# Patient Record
Sex: FEMALE | Race: BLACK OR AFRICAN AMERICAN | Employment: FULL TIME | URBAN - METROPOLITAN AREA
[De-identification: names, ages, dates, MRNs, and addresses within clinical notes are randomized per-mention and may not be internally consistent; named-entity substitution may affect disease eponyms.]

---

## 2017-01-25 ENCOUNTER — HOSPITAL ENCOUNTER (OUTPATIENT)
Dept: MAMMOGRAPHY | Age: 57
Discharge: HOME | End: 2017-01-25
Attending: FAMILY MEDICINE
Payer: SELF-PAY

## 2017-01-25 DIAGNOSIS — Z12.31 ENCOUNTER FOR SCREENING MAMMOGRAM FOR BREAST CANCER: ICD-10-CM

## 2017-01-25 PROCEDURE — 77067 SCR MAMMO BI INCL CAD: CPT

## 2021-02-08 ENCOUNTER — OFFICE VISIT (OUTPATIENT)
Dept: INTERNAL MEDICINE CLINIC | Age: 61
End: 2021-02-08
Payer: COMMERCIAL

## 2021-02-08 VITALS
HEIGHT: 69 IN | DIASTOLIC BLOOD PRESSURE: 67 MMHG | SYSTOLIC BLOOD PRESSURE: 127 MMHG | OXYGEN SATURATION: 96 % | BODY MASS INDEX: 36.89 KG/M2 | RESPIRATION RATE: 14 BRPM | HEART RATE: 63 BPM | WEIGHT: 249.1 LBS

## 2021-02-08 DIAGNOSIS — E11.29 CONTROLLED TYPE 2 DIABETES MELLITUS WITH OTHER DIABETIC KIDNEY COMPLICATION, WITHOUT LONG-TERM CURRENT USE OF INSULIN (HCC): ICD-10-CM

## 2021-02-08 DIAGNOSIS — E66.01 MORBID OBESITY (HCC): Primary | ICD-10-CM

## 2021-02-08 DIAGNOSIS — R82.90 BAD ODOR OF URINE: ICD-10-CM

## 2021-02-08 PROCEDURE — 99204 OFFICE O/P NEW MOD 45 MIN: CPT | Performed by: INTERNAL MEDICINE

## 2021-02-08 RX ORDER — METFORMIN HYDROCHLORIDE 500 MG/1
TABLET ORAL
COMMUNITY
Start: 2020-12-01 | End: 2021-02-27 | Stop reason: SDUPTHER

## 2021-02-08 RX ORDER — BUDESONIDE AND FORMOTEROL FUMARATE DIHYDRATE 160; 4.5 UG/1; UG/1
2 AEROSOL RESPIRATORY (INHALATION) 2 TIMES DAILY
COMMUNITY
End: 2021-07-09 | Stop reason: SDUPTHER

## 2021-02-08 RX ORDER — MOMETASONE FUROATE 50 UG/1
2 SPRAY, METERED NASAL AS NEEDED
COMMUNITY

## 2021-02-08 RX ORDER — IRBESARTAN 75 MG/1
TABLET ORAL
COMMUNITY
Start: 2021-01-05 | End: 2021-05-10 | Stop reason: SDUPTHER

## 2021-02-08 RX ORDER — HYDROCHLOROTHIAZIDE 12.5 MG/1
CAPSULE ORAL
COMMUNITY
Start: 2020-11-17 | End: 2021-02-27 | Stop reason: SDUPTHER

## 2021-02-08 RX ORDER — CHOLECALCIFEROL (VITAMIN D3) 125 MCG
CAPSULE ORAL DAILY
COMMUNITY

## 2021-02-08 RX ORDER — MONTELUKAST SODIUM 10 MG/1
10 TABLET ORAL DAILY
COMMUNITY
End: 2021-11-10 | Stop reason: SDUPTHER

## 2021-02-08 NOTE — PROGRESS NOTES
Subjective:      Jack Brown is a 56 y.o. female who presents today for   Chief Complaint   Patient presents with   [address]     Prior pcp Dr. Olivia Mckeon    type 2 DM  takes metformin  Last A1c was 6.1    10/3/20  Ophthalmologist sees annually  Podiatrist  Refer to Dr. Fadi Varela  Pneumovax 2020  Flu  Sept 2020  Covid 19 vaccine requests      Hypertension  avapro and hctz  Compliant and denies side effects    Asthma  Has been stable, no recent flare ups    Vit d deficiency  Takes Vit D supplement    Obesity has gained 30 lbs  Height- 5'81/2     Weight-249 lbs          Needs to set up complete physical      Patient Active Problem List    Diagnosis Date Noted    Uncomplicated asthma 75/12/8309    Vitamin D deficiency 11/23/2016     Current Outpatient Medications   Medication Sig Dispense Refill    budesonide-formoteroL (Symbicort) 160-4.5 mcg/actuation HFAA Take 2 Puffs by inhalation two (2) times a day.  irbesartan (AVAPRO) 75 mg tablet TAKE 1 TABLET BY MOUTH ONCE DAILY      hydroCHLOROthiazide (MICROZIDE) 12.5 mg capsule TAKE 1 CAPSULE BY MOUTH ONCE DAILY      metFORMIN (GLUCOPHAGE) 500 mg tablet TAKE 1 TABLET BY MOUTH TWICE DAILY      montelukast (Singulair) 10 mg tablet Take 10 mg by mouth daily.  cholecalciferol, vitamin D3, 50 mcg (2,000 unit) tab Take  by mouth daily.  mometasone (Nasonex) 50 mcg/actuation nasal spray 2 Sprays by Both Nostrils route as needed.  multivitamin with iron (DAILY MULTI-VITAMINS/IRON) tablet Take 1 Tab by mouth daily.  ibuprofen (MOTRIN) 800 mg tablet Take 1 Tab by mouth every eight (8) hours as needed for Pain. 30 Tab 1    cetirizine (ZYRTEC) 10 mg tablet Take  by mouth.  albuterol (PROVENTIL HFA, VENTOLIN HFA, PROAIR HFA) 90 mcg/actuation inhaler Take 1 Puff by inhalation every four (4) hours as needed for Wheezing.  (Patient not taking: Reported on 2/8/2021) 1 Inhaler 6     Allergies   Allergen Reactions    Latex Swelling  Sulfa (Sulfonamide Antibiotics) Swelling     Past Medical History:   Diagnosis Date    Asthma     Diabetes ([address]     History of seasonal allergies     Hypertension      History reviewed. No pertinent surgical history. Family History   Problem Relation Age of Onset    Diabetes Mother     Hypertension Mother     Kidney Disease Mother     No Known Problems Father     Cancer Sister         Breast    Breast Cancer Sister 52    Huntingtons disease Maternal Aunt     Huntingtons disease Maternal Grandfather      Social History     Tobacco Use    Smoking status: Never Smoker    Smokeless tobacco: Never Used   Substance Use Topics    Alcohol use: Yes     Frequency: Monthly or less     Drinks per session: 1 or 2     Binge frequency: Never        Review of Systems    A comprehensive review of systems was negative except for that written in the HPI. Objective:     Visit Vitals  /67 (BP 1 Location: Left upper arm, BP Patient Position: Sitting, BP Cuff Size: Large adult)   Pulse 63   Resp 14   Ht 5' 8.5\" (1.74 m)   Wt 249 lb 1.6 oz (113 kg)   SpO2 96%   BMI 37.32 kg/m²     General:  Alert, cooperative, no distress, appears stated age. Head:  Normocephalic, without obvious abnormality, atraumatic. Eyes:  Conjunctivae/corneas clear. PERRL, EOMs intact. Fundi benign. Ears:  Normal TMs and external ear canals both ears. Neck: Supple, symmetrical, trachea midline, no adenopathy, thyroid: no enlargement/tenderness/nodules, no carotid bruit and no JVD. Back:   Symmetric, no curvature. ROM normal. No CVA tenderness. Lungs:   Clear to auscultation bilaterally. Chest wall:  No tenderness or deformity. Heart:  Regular rate and rhythm, S1, S2 normal, no murmur, click, rub or gallop. Abdomen:   Soft, non-tender. Bowel sounds normal. No masses,  No organomegaly. Extremities: Extremities normal, atraumatic, no cyanosis or edema. Pulses: 2+ and symmetric all extremities.    Skin: Skin color, texture, turgor normal. No rashes or lesions. Lymph nodes: Cervical, supraclavicular, and axillary nodes normal.   Neurologic: CNII-XII intact. Normal strength, sensation and reflexes throughout. Assessment/Plan:       ICD-10-CM ICD-9-CM    1. Morbid obesity ([facility] [address]  E66.01 278.01 REFERRAL TO NUTRITION      REFERRAL TO WEIGHT LOSS   2. Controlled type 2 diabetes mellitus with other diabetic kidney complication, without long-term current use of insulin ([facility])  E11.29 250.40 LIPID PANEL      METABOLIC PANEL, COMPREHENSIVE      HEMOGLOBIN A1C WITH EAG      MICROALBUMIN, UR, RAND W/ MICROALB/CREAT RATIO      REFERRAL TO PODIATRY      MICROALBUMIN, UR, RAND W/ MICROALB/CREAT RATIO      HEMOGLOBIN A1C WITH EAG      LIPID PANEL      METABOLIC PANEL, COMPREHENSIVE   3. Bad odor of urine  R82.90 791.9 URINALYSIS W/ RFLX MICROSCOPIC      URINALYSIS W/ RFLX MICROSCOPIC          Advised her to call back or return to office if symptoms worsen/change/persist.  Discussed expected course/resolution/complications of diagnosis in detail with patient. Medication risks/benefits/costs/interactions/alternatives discussed with patient. She was given an after visit summary which includes diagnoses, current medications, & vitals. She expressed understanding with the diagnosis and plan.

## 2021-02-08 NOTE — PROGRESS NOTES
Room: 102    Identified pt with two pt identifiers(name and ). Reviewed record in preparation for visit and have obtained necessary documentation. All patient medications has been reviewed. Chief Complaint   Patient presents with   [address] PCP:  [facility] [facility] Dr. Jose Shah Maintenance Due   Topic    Hepatitis C Screening     Pneumococcal 0-64 years (1 of 1 - PPSV23)    DTaP/Tdap/Td series (1 - Tdap)    PAP AKA CERVICAL CYTOLOGY     Lipid Screen     Shingrix Vaccine Age 49> (1 of 2)    Colorectal Cancer Screening Combo     Breast Cancer Screen Mammogram     Flu Vaccine (1)     Pap: 3/2020 Dr. Lanita Riedel [address]     Shingrix:      Tdap: pt not sure     Colonoscopy:  Dr. Abundio Walls     Pneumonia; last year     Flu shot: 10/2020   Vitals:    21 1040   BP: 127/67   Pulse: 63   Resp: 14   SpO2: 96%   Weight: 249 lb 1.6 oz (113 kg)   Height: 5' 8.5\" (1.74 m)   PainSc:   0 - No pain       4. Have you been to the ER, urgent care clinic since your last visit? Hospitalized since your last visit? No    5. Have you seen or consulted any other health care providers outside of the [address] [place] [address] since your last visit? Include any pap smears or colon screening. No    Patient is accompanied by self I have received verbal consent from Radha Tate to discuss any/all medical information while they are present in the room.

## 2021-02-09 LAB
ALBUMIN SERPL-MCNC: 3.9 G/DL (ref 3.5–5)
ALBUMIN/GLOB SERPL: 1.1 {RATIO} (ref 1.1–2.2)
ALP SERPL-CCNC: 86 U/L (ref 45–117)
ALT SERPL-CCNC: 21 U/L (ref 12–78)
ANION GAP SERPL CALC-SCNC: 7 MMOL/L (ref 5–15)
APPEARANCE UR: ABNORMAL
AST SERPL-CCNC: 15 U/L (ref 15–37)
BILIRUB SERPL-MCNC: 0.2 MG/DL (ref 0.2–1)
BILIRUB UR QL: NEGATIVE
BUN SERPL-MCNC: 15 MG/DL (ref 6–20)
BUN/CREAT SERPL: 17 (ref 12–20)
CALCIUM SERPL-MCNC: 9.8 MG/DL (ref 8.5–10.1)
CHLORIDE SERPL-SCNC: 105 MMOL/L (ref 97–108)
CHOLEST SERPL-MCNC: 176 MG/DL
CO2 SERPL-SCNC: 27 MMOL/L (ref 21–32)
COLOR UR: ABNORMAL
CREAT SERPL-MCNC: 0.87 MG/DL (ref 0.55–1.02)
CREAT UR-MCNC: 142 MG/DL
EST. AVERAGE GLUCOSE BLD GHB EST-MCNC: 128 MG/DL
GLOBULIN SER CALC-MCNC: 3.6 G/DL (ref 2–4)
GLUCOSE SERPL-MCNC: 101 MG/DL (ref 65–100)
GLUCOSE UR STRIP.AUTO-MCNC: NEGATIVE MG/DL
HBA1C MFR BLD: 6.1 % (ref 4–5.6)
HDLC SERPL-MCNC: 90 MG/DL
HDLC SERPL: 2 {RATIO} (ref 0–5)
HGB UR QL STRIP: NEGATIVE
KETONES UR QL STRIP.AUTO: NEGATIVE MG/DL
LDLC SERPL CALC-MCNC: 70.8 MG/DL (ref 0–100)
LEUKOCYTE ESTERASE UR QL STRIP.AUTO: ABNORMAL
LIPID PROFILE,FLP: NORMAL
MICROALBUMIN UR-MCNC: 2.18 MG/DL
MICROALBUMIN/CREAT UR-RTO: 15 MG/G (ref 0–30)
NITRITE UR QL STRIP.AUTO: NEGATIVE
PH UR STRIP: 5.5 [PH] (ref 5–8)
POTASSIUM SERPL-SCNC: 3.9 MMOL/L (ref 3.5–5.1)
PROT SERPL-MCNC: 7.5 G/DL (ref 6.4–8.2)
PROT UR STRIP-MCNC: NEGATIVE MG/DL
SODIUM SERPL-SCNC: 139 MMOL/L (ref 136–145)
SP GR UR REFRACTOMETRY: 1.02 (ref 1–1.03)
TRIGL SERPL-MCNC: 76 MG/DL (ref ?–150)
UROBILINOGEN UR QL STRIP.AUTO: 0.2 EU/DL (ref 0.2–1)
VLDLC SERPL CALC-MCNC: 15.2 MG/DL

## 2021-02-22 ENCOUNTER — HOSPITAL ENCOUNTER (OUTPATIENT)
Dept: NUTRITION | Age: 61
Discharge: HOME | End: 2021-02-22
Payer: COMMERCIAL

## 2021-02-22 DIAGNOSIS — E66.01 MORBID OBESITY (HCC): ICD-10-CM

## 2021-02-22 PROCEDURE — 97802 MEDICAL NUTRITION INDIV IN: CPT | Performed by: DIETITIAN, REGISTERED

## 2021-02-22 NOTE — PROGRESS NOTES
[address] [facility]     Nutrition Assessment - Medical Nutrition Therapy   Outpatient Initial Evaluation         Patient Name: Hermelinda Lo : 1960   Treatment Diagnosis: E66.01 (ICD-10-CM) - Morbid obesity [facility])   Referral Source: Hector Augustin [facility]): 2021     In time:   10:30am          Out time:   11:30am   Total Treatment Time (min): 60     Gender: female Age: 59 y.o. Ht: 68 in Wt:  250 lb  kg   BMI: 38 (obesity class II) AF 1.2 BMR Female 1745     Past Medical History:  Patient Active Problem List   Diagnosis Code    Uncomplicated asthma I44.292    Vitamin D deficiency E55.9        Pertinent Medications:     Current Outpatient Medications:     budesonide-formoteroL (Symbicort) 160-4.5 mcg/actuation HFAA, Take 2 Puffs by inhalation two (2) times a day., Disp: , Rfl:     irbesartan (AVAPRO) 75 mg tablet, TAKE 1 TABLET BY MOUTH ONCE DAILY, Disp: , Rfl:     hydroCHLOROthiazide (MICROZIDE) 12.5 mg capsule, TAKE 1 CAPSULE BY MOUTH ONCE DAILY, Disp: , Rfl:     metFORMIN (GLUCOPHAGE) 500 mg tablet, TAKE 1 TABLET BY MOUTH TWICE DAILY, Disp: , Rfl:     montelukast (Singulair) 10 mg tablet, Take 10 mg by mouth daily. , Disp: , Rfl:     cholecalciferol, vitamin D3, 50 mcg (2,000 unit) tab, Take  by mouth daily. , Disp: , Rfl:     mometasone (Nasonex) 50 mcg/actuation nasal spray, 2 Sprays by Both Nostrils route as needed. , Disp: , Rfl:     cetirizine (ZYRTEC) 10 mg tablet, Take  by mouth., Disp: , Rfl:     multivitamin with iron (DAILY MULTI-VITAMINS/IRON) tablet, Take 1 Tab by mouth daily. , Disp: , Rfl:     ibuprofen (MOTRIN) 800 mg tablet, Take 1 Tab by mouth every eight (8) hours as needed for Pain., Disp: 30 Tab, Rfl: 1    albuterol (PROVENTIL HFA, VENTOLIN HFA, PROAIR HFA) 90 mcg/actuation inhaler, Take 1 Puff by inhalation every four (4) hours as needed for Wheezing.  (Patient not taking: Reported on 2021), Disp: 1 Inhaler, Rfl: 6     Biochemical Data:   Lab Results   Component Value Date/Time    Hemoglobin A1c 6.1 (H) 02/08/2021 12:41 PM     Lab Results   Component Value Date/Time    Sodium 139 02/08/2021 12:41 PM    Potassium 3.9 02/08/2021 12:41 PM    Chloride 105 02/08/2021 12:41 PM    CO2 27 02/08/2021 12:41 PM    Anion gap 7 02/08/2021 12:41 PM    Glucose 101 (H) 02/08/2021 12:41 PM    BUN 15 02/08/2021 12:41 PM    Creatinine 0.87 02/08/2021 12:41 PM    BUN/Creatinine ratio 17 02/08/2021 12:41 PM    GFR est AA >60 02/08/2021 12:41 PM    GFR est non-AA >60 02/08/2021 12:41 PM    Calcium 9.8 02/08/2021 12:41 PM    Bilirubin, total 0.2 02/08/2021 12:41 PM    Alk. phosphatase 86 02/08/2021 12:41 PM    Protein, total 7.5 02/08/2021 12:41 PM    Albumin 3.9 02/08/2021 12:41 PM    Globulin 3.6 02/08/2021 12:41 PM    A-G Ratio 1.1 02/08/2021 12:41 PM    ALT (SGPT) 21 02/08/2021 12:41 PM    AST (SGOT) 15 02/08/2021 12:41 PM     Lab Results   Component Value Date/Time    Cholesterol, total 176 02/08/2021 12:41 PM    HDL Cholesterol 90 02/08/2021 12:41 PM    LDL, calculated 70.8 02/08/2021 12:41 PM    VLDL, calculated 15.2 02/08/2021 12:41 PM    Triglyceride 76 02/08/2021 12:41 PM    CHOL/HDL Ratio 2.0 02/08/2021 12:41 PM     BP Readings from Last 3 Encounters:   02/08/21 127/67   11/23/16 (!) 158/92   No results found for: Florentino Mcneill, Yadi Mojica, XQVID, VD3RIA, [id number]         Assessment:   Pt is a 57yo female here for help with weight loss. She notes she knows what to eat but needs help with accountability. Notes with Pandemic it has been harder. Gained 30# in 1 year. Not interested in weight loss surgery. Pt used to do weight watchers successfully 5-6 years ago down to 184#. Pt notes not wanting to do a virtual program.   Pt's desired weight was 170-180#. Goals are to be able to walk and job. Pt has diabetes. Controlled A1C at 6.1%. pt notes wanting to get it down in the 5s. Pt does not check SMBG. History of HTN.    Support system with daughter at home. Activity: less than in the past due to fear of COVID. Wants to start walking again. Has tapes to use inside if unable to go outside. Loves to garden when weather permits. Food & Nutrition: Pt identified Issues:  Snacker: crackers, pb or ritz or  Mindless eating while watching TV. Fruit or nuts. Night eater - staying up till 2am due to not needing to wake up early. History of night shift. Expressed concerns with eating bread and thinking this may lead to weight gain. Pt's intake of crackers and snacks are higher in calorie and carbohydrate than her bread. Asked questions about keto diet and if it would be a good idea for her. B- cereal (honey bunches of oats with strawberry) 2 cups+ banana + no sugar almond milk + coffee with unsweetened creamer + splenda/stevia. S- cookie, cheezits - crunch/munch  L- stromboli leftovers (1-2\")  OR sandwich with ham/turkey + cheese + 647 bread or wheat bread (pan size)  S- small donuts  D-   S- tangelo  Night snacking on crackers. Drinks: water, coffee         Estimate Needs:    Equation( [x] MSJ ; []  HBE; [] Rowell; [] other)  * Activity Factor (1.2) -250-500   Calories: 3020-7492  Protein: 91 Carbs: 203-225 Fat: 60   Kcal/day  g/day  g/day  g/day        percent: 20  45-50  30               Nutrition Diagnosis obesity R/T excessive energy intake AEB BMI of 38 and dietary recall showing mindless snacking at night and during the day. Physical inactivity R/T emotional barriers AEB pt report of fears of COVID reducing her overall activity level and movement    Food and nutrition related knowledge deficit R/T lack of prior education for healthy weight loss AEB request for counseling     Nutrition Intervention &  Education: Educated pt on the basics of healthy weight loss and the rationale for dietary modifications and increased activity.  Educated pt on lean proteins, healthy fats, non-starchy vegetables, and complex carbohydrate food sources. Discussed limiting carbohydrates and calories form night time snacking, label reading for serving sizes, meal timing, and keeping a food log. Provided example day. suggested alternative lower calorie snacks for at night while pt works to move her bed time up earlier and limit snacking all together. Set exercise goals. Handouts Provided: []  Carbohydrates  []  Protein  [x]  Non-starchy Vegatbles  []  Food Label  [x]  Meal and Snack Ideas  [x]  Food Journals []  Diabetes  []  Cholesterol  []  Sodium  []  Gen Nutr Guidelines  []  SBGM Guidelines  [x]  Others: example day   Information Reviewed with: pt   Readiness to Change Stage: []  Pre-contemplative    []  Contemplative  [x]  Preparation               []  Action                  []  Maintenance   Potential Barriers to Learning: []  Decline in memory    []  Language barrier   []  Other:  [x]  Emotional (fear of COVID)                 []  Limited mobility     []  None  []  Lack of motivation     [] Vision, hearing or cognitive impairment   Expected Compliance: Good due to support at home from daughter     Nutritional Goal - To promote lifestyle changes to result in:    [x]  Weight loss  []  Improved diabetic control  []  Decreased cholesterol levels  []  Decreased blood pressure  []  Weight maintenance []  Preventing any interactions associated with food allergies  []  Adequate weight gain toward goal weight  []  Other:        Patient Goals:   - move bed time up to 11pm. Start by moving it up by 30min per night. Try reading a book instead of screens 30min before bed if unable to sleep. - Improve physical activity w/goal to walk for 30 minutes 5 times per week. - Increase accountability and awareness of food choices by recording food and beverage intake by using a food journal at least 3 days per week.     - swap lower calorie snacks at night: list provided (veggies, portion out crackers, lower calorie snacks)     Dietitian Signature: Omar Ibarra MS, RD, CSSD Date: 2/22/2021   Follow-up: March 8 @ 9am Time: 10:35 AM

## 2021-03-08 ENCOUNTER — HOSPITAL ENCOUNTER (OUTPATIENT)
Dept: NUTRITION | Age: 61
Discharge: HOME | End: 2021-03-08
Payer: MEDICAID

## 2021-03-08 DIAGNOSIS — E66.9 OBESITY, UNSPECIFIED CLASSIFICATION, UNSPECIFIED OBESITY TYPE, UNSPECIFIED WHETHER SERIOUS COMORBIDITY PRESENT: ICD-10-CM

## 2021-03-08 PROCEDURE — 97803 MED NUTRITION INDIV SUBSEQ: CPT | Performed by: DIETITIAN, REGISTERED

## 2021-03-08 NOTE — PROGRESS NOTES
NUTRITION - FOLLOW-UP TREATMENT NOTE  Patient Name: Nael Lat         Date: 3/8/2021  : 1960    YES Patient  Verified  Diagnosis: E66.01 (ICD-10-CM) - Morbid obesity ([facility] [address]   In time:   9:08am        Out time:   10:08am   Total Treatment Time (min): 30     SUBJECTIVE/ASSESSMENT  Current Wt: 242.4 Previous Wt: 250 Wt Change: -7.6     Initial Wt: 250 Total Wt change: -7.6 Height: 68     Changes in medication or medical history? Any new allergies, surgeries or procedures? \NO    If yes, update Summary List   Multivitamin from [employer] (unsure if over 52yo)  Vitamin D (Nature Made)  Vitamin C - EmergenC (not daily)     Nutrition Diagnosis        Diagnosis Status: obesity R/T excessive energy intake AEB BMI of 38 and dietary recall showing mindless snacking at night and during the day. [x]  Improved []  No Change    []  Declined   []  Discontinued      Physical inactivity R/T emotional barriers AEB pt report of fears of COVID reducing her overall activity level and movement  [x]  Improved [x]  No Change    []  Declined   []  Discontinued      Food and nutrition related knowledge deficit R/T lack of prior education for healthy weight loss AEB request for counseling  [x]  Improved []  No Change    []  Declined   []  Discontinued        Nutrition Monitoring and Evaluation: Difficulty with tracking some foods but more aware of what she had by tracking. Discussed using michael instead to help with  Looking up various items. Notable that multiple days tracked are <1200kcal. She was aware and felt that with being less busy at work this would not happen in the future. Pt agreed. She is excited to add in exercise once not working 12hr shifts each day. No days off in past 2 weeks. Reviewed goals. She plans to go to grocery store to get more snack options for at home and feels confident in continuing changes made. Previous goals: Work schedule has dictated earlier bed time.  - move bed time up to 11pm. Start by moving it up by 30min per night. Try reading a book instead of screens 30min before bed if unable to sleep. Not met. 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 - Improve physical activity w/goal to walk for 30 minutes 5 times per week   Met. - Increase accountability and awareness of food choices by recording food and beverage intake by using a food journal at least 3 days per week. No night snacking as has not been awake. - swap lower calorie snacks at night: list provided (veggies, portion out crackers, lower calorie snacks)     Nutrition Prescription and  Intervention CBT  Reviewed logs and educated on michael options for tracking. Self monitoring - michael food log. Exercise goals to work towards 150min of activity per week minimum. Answered questions about cereal, granola, and other snack foods. Recommended looking for cereal with 5g or more of fiber per serving and <9g added sugar. Educated pm third party testing for supplements. Estimate Needs:    Equation( [x]?? MSJ ; []??  HBE; []?? Rowell; []?? other)  * Activity Factor (1.2) -250-500   Calories: 1362-5175  Protein: 91 Carbs: 203-225 Fat: 60   Kcal/day  g/day  g/day  g/day            percent: 20   45-50   30        Patient Education:  [x]  Review current plan with patient   []  Other:    Handouts/  Information Provided: []  Carbohydrates  []  Protein  []  Fiber  []  Serving Sizes  []  Fluids  []  General guidelines []  Diabetes  []  Cholesterol  []  Sodium  []  SBGM  []  Food Journals  []  Others:      Patient Goals -continue tracking 3 days per week. Can use michael instead of paper to help. -start exercise routine with walking or elliptical 3 x per week for 30min minimum  -go to bed by 12:30pm to decrease likelihood of night time eating.         PLAN  [x]  Continue on current plan []  Follow-up PRN   []  Discharge due to :    [x]  Next appt: 4 weeks     Dietitian: Yojana Morris MS, RD, CSSD    Date: 3/8/2021 Time: 10:30 AM

## 2021-04-05 ENCOUNTER — HOSPITAL ENCOUNTER (OUTPATIENT)
Dept: NUTRITION | Age: 61
Discharge: HOME | End: 2021-04-05
Payer: COMMERCIAL

## 2021-04-05 DIAGNOSIS — Z71.3 DIETARY COUNSELING AND SURVEILLANCE: ICD-10-CM

## 2021-04-05 DIAGNOSIS — E66.9 OBESITY, UNSPECIFIED CLASSIFICATION, UNSPECIFIED OBESITY TYPE, UNSPECIFIED WHETHER SERIOUS COMORBIDITY PRESENT: ICD-10-CM

## 2021-04-05 PROCEDURE — 97803 MED NUTRITION INDIV SUBSEQ: CPT | Performed by: DIETITIAN, REGISTERED

## 2021-04-05 NOTE — PROGRESS NOTES
NUTRITION - FOLLOW-UP TREATMENT NOTE  Patient Name: Mary Suazo         Date: 2021  : 1960    YES Patient  Verified  Diagnosis: E66.01 (ICD-10-CM) - Morbid obesity ([address]   In time:   9:30am        Out time:   10:00am   Total Treatment Time (min): 30     SUBJECTIVE/ASSESSMENT  Current Wt: 238 Previous Wt: 242.4 Wt Change: -4.4     Initial Wt: 250 Total Wt change: -12 Height: 68     Changes in medication or medical history? Any new allergies, surgeries or procedures? \NO    If yes, update Summary List        Nutrition Diagnosis        Diagnosis Status: obesity R/T excessive energy intake AEB BMI of 38 and dietary recall showing mindless snacking at night and during the day. [x]  Improved []  No Change    []  Declined   []  Discontinued      Physical inactivity R/T emotional barriers AEB pt report of fears of COVID reducing her overall activity level and movement  [x]  Improved []  No Change    []  Declined   []  Discontinued      Food and nutrition related knowledge deficit R/T lack of prior education for healthy weight loss AEB request for counseling  [x]  Improved []  No Change    []  Declined   []  Discontinued        Nutrition Monitoring and Evaluation: Tried elliptical for 5 minutes and found it difficult. Starting with videos with daughter. 15-20 min 3 times per week. Plans to start gardening. Has not started walking. Very few days of going below calorie goals. 51\" waist  BMI 36.2- Obesity Class II  RMR MSJ- 1683    Previous goals:  Met. -continue tracking 3 days per week. Can use michael instead of paper to help. Improved. 20 min videos 3 times per week. -start exercise routine with walking or elliptical 3 x per week for 30min minimum  Improved. Going to bed earlier most nights. Less night time snacking. choosing lower calorie snacks for night instead. -go to bed by 12:30pm to decrease likelihood of night time eating.       Nutrition Prescription and  Intervention CBT   Self monitoring - michael food log. Exercise goals to work towards 150min of activity per week minimum. Discussed meal timing and recommended earlier end to eating window. Estimate Needs:    Equation( [x]?? MSJ ; []??  HBE; []?? Rowell; []?? other)  * Activity Factor (1.2) -250-500   Calories: 0534-0259  Protein: 91 Carbs: 203-225 Fat: 60   Kcal/day  g/day  g/day  g/day            percent: 20   45-50   30        Patient Education:  [x]  Review current plan with patient   []  Other:    Handouts/  Information Provided: []  Carbohydrates  []  Protein  []  Fiber  []  Serving Sizes  []  Fluids  []  General guidelines []  Diabetes  []  Cholesterol  []  Sodium  []  SBGM  []  Food Journals  []  Others:      Patient Goals -continue tracking 3 days per week.  Can use michael instead of paper to help.   -continuing exercise routine with walking 3x/week for 30 minutes + 2 exercise videos/day with daughter      PLAN  [x]  Continue on current plan []  Follow-up PRN   []  Discharge due to :    [x]  Next appt: 4 weeks     Dietitian: Wally Starr MS, RD, CSSD    Date: 4/5/2021 Time: 10:30 AM

## 2021-05-10 ENCOUNTER — OFFICE VISIT (OUTPATIENT)
Dept: INTERNAL MEDICINE CLINIC | Age: 61
End: 2021-05-10
Payer: COMMERCIAL

## 2021-05-10 VITALS
WEIGHT: 235 LBS | DIASTOLIC BLOOD PRESSURE: 71 MMHG | SYSTOLIC BLOOD PRESSURE: 106 MMHG | OXYGEN SATURATION: 98 % | RESPIRATION RATE: 18 BRPM | HEART RATE: 66 BPM | HEIGHT: 69 IN | BODY MASS INDEX: 34.8 KG/M2 | TEMPERATURE: 98 F

## 2021-05-10 DIAGNOSIS — E55.9 VITAMIN D DEFICIENCY: Primary | ICD-10-CM

## 2021-05-10 DIAGNOSIS — E66.01 MORBID OBESITY (HCC): ICD-10-CM

## 2021-05-10 DIAGNOSIS — E11.29 CONTROLLED TYPE 2 DIABETES MELLITUS WITH OTHER DIABETIC KIDNEY COMPLICATION, WITHOUT LONG-TERM CURRENT USE OF INSULIN (HCC): ICD-10-CM

## 2021-05-10 DIAGNOSIS — L81.9 HYPOPIGMENTATION: ICD-10-CM

## 2021-05-10 DIAGNOSIS — I10 ESSENTIAL HYPERTENSION: ICD-10-CM

## 2021-05-10 DIAGNOSIS — D17.20 LIPOMA OF UPPER EXTREMITY, UNSPECIFIED LATERALITY: ICD-10-CM

## 2021-05-10 PROCEDURE — 99214 OFFICE O/P EST MOD 30 MIN: CPT | Performed by: INTERNAL MEDICINE

## 2021-05-10 RX ORDER — HYDROCHLOROTHIAZIDE 12.5 MG/1
CAPSULE ORAL
Qty: 90 CAP | Refills: 1 | Status: SHIPPED | OUTPATIENT
Start: 2021-05-10 | End: 2021-11-10 | Stop reason: SDUPTHER

## 2021-05-10 RX ORDER — IRBESARTAN 75 MG/1
TABLET ORAL
Qty: 90 TAB | Refills: 1 | Status: SHIPPED | OUTPATIENT
Start: 2021-05-10 | End: 2021-11-10 | Stop reason: SDUPTHER

## 2021-05-10 NOTE — PROGRESS NOTES
Subjective:      Thierry Virk is a 59 y.o. female who presents today for   Chief Complaint   Patient presents with    Diabetes    Hypertension          type 2 DM  takes metformin  Last A1c was 6.1    2/2021  Ophthalmologist sees annually  Podiatrist she is followed by Dr. Ayah Padron  Flu  Sept 2020  Covid 19 vaccine  moderna x 2        Hypertension  BP today 106/71  Takes avapro and hctz  Compliant and denies side effects     Asthma  Has been stable, no recent flare ups     Vit d deficiency  Takes Vit D supplement     Obesity  Height- 5'81/2     Weight-249 lbs   At last visit     Height 5'81/2      Weight-  235 lbs     Today    She has lost 14 lbs since last visit! Tinea pedis  onychomycosis     Was referred to Angelica Klein (nutritionist) and Dr. Noemí Lawson (weight loss)  Patient Active Problem List    Diagnosis Date Noted    Uncomplicated asthma 93/45/6427    Vitamin D deficiency 11/23/2016     Current Outpatient Medications   Medication Sig Dispense Refill    hydroCHLOROthiazide (MICROZIDE) 12.5 mg capsule TAKE 1 CAPSULE BY MOUTH ONCE DAILY 90 Cap 1    metFORMIN (GLUCOPHAGE) 500 mg tablet TAKE 1 TABLET BY MOUTH TWICE DAILY 180 Tab 1    budesonide-formoteroL (Symbicort) 160-4.5 mcg/actuation HFAA Take 2 Puffs by inhalation two (2) times a day.  irbesartan (AVAPRO) 75 mg tablet TAKE 1 TABLET BY MOUTH ONCE DAILY      montelukast (Singulair) 10 mg tablet Take 10 mg by mouth daily.  cholecalciferol, vitamin D3, 50 mcg (2,000 unit) tab Take  by mouth daily.  mometasone (Nasonex) 50 mcg/actuation nasal spray 2 Sprays by Both Nostrils route as needed.  cetirizine (ZYRTEC) 10 mg tablet Take  by mouth.  multivitamin with iron (DAILY MULTI-VITAMINS/IRON) tablet Take 1 Tab by mouth daily.  ibuprofen (MOTRIN) 800 mg tablet Take 1 Tab by mouth every eight (8) hours as needed for Pain.  30 Tab 1    albuterol (PROVENTIL HFA, VENTOLIN HFA, PROAIR HFA) 90 mcg/actuation inhaler Take 1 Puff by inhalation every four (4) hours as needed for Wheezing. (Patient not taking: Reported on 2/8/2021) 1 Inhaler 6     Allergies   Allergen Reactions    Latex Swelling    Sulfa (Sulfonamide Antibiotics) Swelling     Past Medical History:   Diagnosis Date    Asthma     Diabetes ([address]     History of seasonal allergies     Hypertension      No past surgical history on file. Family History   Problem Relation Age of Onset    Diabetes Mother     Hypertension Mother     Kidney Disease Mother     No Known Problems Father     Cancer Sister         Breast    Breast Cancer Sister 52    Huntingtons disease Maternal Aunt     Huntingtons disease Maternal Grandfather      Social History     Tobacco Use    Smoking status: Never Smoker    Smokeless tobacco: Never Used   Substance Use Topics    Alcohol use: Yes     Frequency: Monthly or less     Drinks per session: 1 or 2     Binge frequency: Never        Review of Systems    A comprehensive review of systems was negative except for that written in the HPI. Objective:     Visit Vitals  Ht 5' 8.5\" (1.74 m)   BMI 37.32 kg/m²     General:  Alert, cooperative, no distress, appears stated age. Head:  Normocephalic, without obvious abnormality, atraumatic. Eyes:  Conjunctivae/corneas clear. PERRL, EOMs intact. Fundi benign. Ears:  Normal TMs and external ear canals both ears. Nose: Nares normal. Septum midline. Mucosa normal. No drainage or sinus tenderness. Throat: Lips, mucosa, and tongue normal. Teeth and gums normal.   Neck: Supple, symmetrical, trachea midline, no adenopathy, thyroid: no enlargement/tenderness/nodules, no carotid bruit and no JVD. Back:   Symmetric, no curvature. ROM normal. No CVA tenderness. Lungs:   Clear to auscultation bilaterally. Chest wall:  No tenderness or deformity. Heart:  Regular rate and rhythm, S1, S2 normal, no murmur, click, rub or gallop. Abdomen:   Soft, non-tender.  Bowel sounds normal. No masses,  No organomegaly. Extremities: Extremities normal, atraumatic, no cyanosis or edema. Pulses: 2+ and symmetric all extremities. Skin: Skin color, texture, turgor normal. No rashes or lesions. Lymph nodes: Cervical, supraclavicular, and axillary nodes normal.   Neurologic: CNII-XII intact. Normal strength, sensation and reflexes throughout. Assessment/Plan:       ICD-10-CM ICD-9-CM    1. Vitamin D deficiency  E55.9 268.9 Continue Vit D supplement   2. Morbid obesity ([address]  E66.01 278.01 Continue dietary changes and work with nutritionist   3. Controlled type 2 diabetes mellitus with other diabetic kidney complication, without long-term current use of insulin ([facility])  E11.29 250.40 HEMOGLOBIN A1C WITH EAG      HEMOGLOBIN A1C WITH EAG   4. Essential hypertension  I10 401.9 irbesartan (AVAPRO) 75 mg tablet      hydroCHLOROthiazide (MICROZIDE) 12.5 mg capsule      METABOLIC PANEL, COMPREHENSIVE      URINALYSIS W/ RFLX MICROSCOPIC      URINALYSIS W/ RFLX MICROSCOPIC      METABOLIC PANEL, COMPREHENSIVE   5. Hypopigmentation  L81.9 709.00 REFERRAL TO DERMATOLOGY   6. Lipoma of upper extremity, unspecified laterality  D17.20 214.8 REFERRAL TO GENERAL SURGERY          Advised her to call back or return to office if symptoms worsen/change/persist.  Discussed expected course/resolution/complications of diagnosis in detail with patient. Medication risks/benefits/costs/interactions/alternatives discussed with patient. She was given an after visit summary which includes diagnoses, current medications, & vitals. She expressed understanding with the diagnosis and plan.

## 2021-05-10 NOTE — PROGRESS NOTES
Issa Palomino is a 59 y.o. female      Chief Complaint   Patient presents with    Diabetes    Hypertension     1. Have you been to the ER, urgent care clinic since your last visit? Hospitalized since your last visit? No    2. Have you seen or consulted any other health care providers outside of the [address] [place] [address] since your last visit? Include any pap smears or colon screening.   No

## 2021-05-11 LAB
ALBUMIN SERPL-MCNC: 4 G/DL (ref 3.5–5)
ALBUMIN/GLOB SERPL: 1.1 {RATIO} (ref 1.1–2.2)
ALP SERPL-CCNC: 95 U/L (ref 45–117)
ALT SERPL-CCNC: 43 U/L (ref 12–78)
ANION GAP SERPL CALC-SCNC: 8 MMOL/L (ref 5–15)
APPEARANCE UR: CLEAR
AST SERPL-CCNC: 24 U/L (ref 15–37)
BACTERIA URNS QL MICRO: ABNORMAL /HPF
BILIRUB SERPL-MCNC: 0.3 MG/DL (ref 0.2–1)
BILIRUB UR QL: NEGATIVE
BUN SERPL-MCNC: 14 MG/DL (ref 6–20)
BUN/CREAT SERPL: 16 (ref 12–20)
CALCIUM SERPL-MCNC: 9.8 MG/DL (ref 8.5–10.1)
CAOX CRY URNS QL MICRO: ABNORMAL
CHLORIDE SERPL-SCNC: 109 MMOL/L (ref 97–108)
CO2 SERPL-SCNC: 26 MMOL/L (ref 21–32)
COLOR UR: ABNORMAL
CREAT SERPL-MCNC: 0.85 MG/DL (ref 0.55–1.02)
EPITH CASTS URNS QL MICRO: ABNORMAL /LPF
EST. AVERAGE GLUCOSE BLD GHB EST-MCNC: 131 MG/DL
GLOBULIN SER CALC-MCNC: 3.7 G/DL (ref 2–4)
GLUCOSE SERPL-MCNC: 109 MG/DL (ref 65–100)
GLUCOSE UR STRIP.AUTO-MCNC: NEGATIVE MG/DL
HBA1C MFR BLD: 6.2 % (ref 4–5.6)
HGB UR QL STRIP: NEGATIVE
KETONES UR QL STRIP.AUTO: NEGATIVE MG/DL
LEUKOCYTE ESTERASE UR QL STRIP.AUTO: ABNORMAL
NITRITE UR QL STRIP.AUTO: POSITIVE
PH UR STRIP: 5.5 [PH] (ref 5–8)
POTASSIUM SERPL-SCNC: 4.3 MMOL/L (ref 3.5–5.1)
PROT SERPL-MCNC: 7.7 G/DL (ref 6.4–8.2)
PROT UR STRIP-MCNC: NEGATIVE MG/DL
RBC #/AREA URNS HPF: ABNORMAL /HPF (ref 0–5)
SODIUM SERPL-SCNC: 143 MMOL/L (ref 136–145)
SP GR UR REFRACTOMETRY: 1.02 (ref 1–1.03)
UROBILINOGEN UR QL STRIP.AUTO: 0.2 EU/DL (ref 0.2–1)
WBC URNS QL MICRO: ABNORMAL /HPF (ref 0–4)

## 2021-05-13 ENCOUNTER — OFFICE VISIT (OUTPATIENT)
Dept: SURGERY | Age: 61
End: 2021-05-13
Payer: COMMERCIAL

## 2021-05-13 VITALS
DIASTOLIC BLOOD PRESSURE: 77 MMHG | TEMPERATURE: 96.8 F | SYSTOLIC BLOOD PRESSURE: 113 MMHG | OXYGEN SATURATION: 93 % | HEIGHT: 68 IN | WEIGHT: 235 LBS | BODY MASS INDEX: 35.61 KG/M2 | HEART RATE: 72 BPM

## 2021-05-13 DIAGNOSIS — D17.20 LIPOMA OF UPPER EXTREMITY, UNSPECIFIED LATERALITY: Primary | ICD-10-CM

## 2021-05-13 PROCEDURE — 99204 OFFICE O/P NEW MOD 45 MIN: CPT | Performed by: SURGERY

## 2021-05-13 RX ORDER — CLOTRIMAZOLE AND BETAMETHASONE DIPROPIONATE 10; .64 MG/G; MG/G
CREAM TOPICAL
COMMUNITY
Start: 2021-05-03

## 2021-05-13 RX ORDER — CICLOPIROX 80 MG/ML
SOLUTION TOPICAL
COMMUNITY
Start: 2021-04-20

## 2021-05-13 NOTE — PROGRESS NOTES
Surgery History and Physical    Subjective:      Lavern Bowman is a 59 y.o. female who presents for evaluation of multiple lipomas on her upper extremity. She is lipoma prone and has had them removed before from Dr. Wanda Brown. She wants to get some of them excised because they are getting bigger and starting to bulge out. Past Medical History:   Diagnosis Date    Asthma     Diabetes ([address]     History of seasonal allergies     Hypertension      Past Surgical History:   Procedure Laterality Date    HX TUBAL LIGATION  1983      Family History   Problem Relation Age of Onset    Diabetes Mother     Hypertension Mother     Kidney Disease Mother     No Known Problems Father     Cancer Sister         Breast    Breast Cancer Sister 52    Huntingtons disease Maternal Aunt     Huntingtons disease Maternal Grandfather      Social History     Tobacco Use    Smoking status: Never Smoker    Smokeless tobacco: Never Used   Substance Use Topics    Alcohol use: Yes     Frequency: Monthly or less     Drinks per session: 1 or 2     Binge frequency: Never      Prior to Admission medications    Medication Sig Start Date End Date Taking? Authorizing Provider   ciclopirox (PENLAC) 8 % solution APPLY SOLUTION TOPICALLY ONCE DAILY REMOVE ONCE A WEEK 4/20/21  Yes Provider, Historical   clotrimazole-betamethasone (LOTRISONE) topical cream APPLY CREAM TOPICALLY TO AFFECTED AREA TWICE DAILY 5/3/21  Yes Provider, Historical   irbesartan (AVAPRO) 75 mg tablet TAKE 1 TABLET BY MOUTH ONCE DAILY 5/10/21  Yes Antionette Blount MD   hydroCHLOROthiazide (MICROZIDE) 12.5 mg capsule TAKE 1 CAPSULE BY MOUTH ONCE DAILY 5/10/21  Yes Antionette Blount MD   metFORMIN (GLUCOPHAGE) 500 mg tablet TAKE 1 TABLET BY MOUTH TWICE DAILY 2/27/21  Yes Antionette Blount MD   budesonide-formoteroL (Symbicort) 160-4.5 mcg/actuation HFAA Take 2 Puffs by inhalation two (2) times a day.    Yes Provider, Historical   montelukast (Singulair) 10 mg tablet Take 10 mg by mouth daily. Yes Provider, Historical   cholecalciferol, vitamin D3, 50 mcg (2,000 unit) tab Take  by mouth daily. Yes Provider, Historical   mometasone (Nasonex) 50 mcg/actuation nasal spray 2 Sprays by Both Nostrils route as needed. Yes Provider, Historical   multivitamin with iron (DAILY MULTI-VITAMINS/IRON) tablet Take 1 Tab by mouth daily. Yes Provider, Historical   ibuprofen (MOTRIN) 800 mg tablet Take 1 Tab by mouth every eight (8) hours as needed for Pain. 11/23/16  Yes Quynh Azul MD   cetirizine (ZYRTEC) 10 mg tablet Take  by mouth. Provider, Historical   albuterol (PROVENTIL HFA, VENTOLIN HFA, PROAIR HFA) 90 mcg/actuation inhaler Take 1 Puff by inhalation every four (4) hours as needed for Wheezing. 11/23/16   Quynh Azul MD      Allergies   Allergen Reactions    Latex Swelling    Sulfa (Sulfonamide Antibiotics) Swelling       Review of Systems:  A comprehensive review of systems was negative except for that written in the History of Present Illness. Objective:     Visit Vitals  /77 (BP 1 Location: Left arm, BP Patient Position: Sitting)   Pulse 72   Temp 96.8 °F (36 °C) (Oral)   Ht 5' 8\" (1.727 m)   Wt 106.6 kg (235 lb)   SpO2 93%   BMI 35.73 kg/m²         Physical Exam:  Physical Exam:  General:  Alert, cooperative, no distress, appears stated age. Eyes:  Conjunctivae/corneas clear. Ears:  Normal external ear canals both ears. Nose: Nares normal. Septum midline. Mouth/Throat: Lips, mucosa, and tongue normal. Teeth and gums normal.   Neck: Supple, symmetrical, trachea midline   Back:   Symmetric, no curvature. ROM normal.    Lungs:   Clear to auscultation bilaterally. Heart:  Regular rate and rhythm   Abdomen:   Soft, non-tender. Bowel sounds normal. No masses,  No organomegaly. Extremities: Extremities normal, atraumatic, no cyanosis or edema.    Skin: Multiple lipomas on her bilateral upper extremities, lower extremities, and back         Assessment:     59year old female with multiple lipomas     Plan:     Discussed the risk of surgery including bleeding, infection, and recurrence,  and the risks of MAC anesthetic. The patient understands the risks; any and all questions were answered to the patient's satisfaction.  -After discussing with the patient, we will plan on excising her symptomatic lipomas on her bilateral upper extremities  -Will schedule at the patients earliest convenience    The patient was counseled at length about the risks of jessica Covid-19 during their perioperative period and any recovery window from their procedure. The patient was made aware that jessica Covid-19  may worsen their prognosis for recovering from their procedure and lend to a higher morbidity and/or mortality risk. All material risks, benefits, and reasonable alternatives including postponing the procedure were discussed. The patient does  wish to proceed with the procedure at this time.

## 2021-05-13 NOTE — PROGRESS NOTES
Identified pt with two pt identifiers(name and ). Reviewed record in preparation for visit and have obtained necessary documentation. All patient medications has been reviewed. Chief Complaint   Patient presents with    Mass     Seen ath the request of Dr Don Garcia for eval of lipoma of both arms       Health Maintenance Due   Topic    Hepatitis C Screening     Pneumococcal 0-64 years (1 of 1 - PPSV23)    Foot Exam Q1     Eye Exam Retinal or Dilated     COVID-19 Vaccine (1)    DTaP/Tdap/Td series (1 - Tdap)    PAP AKA CERVICAL CYTOLOGY     Shingrix Vaccine Age 49> (1 of 2)    Colorectal Cancer Screening Combo     Breast Cancer Screen Mammogram        Vitals:    21 1024   BP: 113/77   Pulse: 72   Temp: 96.8 °F (36 °C)   TempSrc: Oral   SpO2: 93%   Weight: 106.6 kg (235 lb)   Height: 5' 8\" (1.727 m)   PainSc:   0 - No pain       4. Have you been to the ER, urgent care clinic since your last visit? Hospitalized since your last visit? No    5. Have you seen or consulted any other health care providers outside of the [address] [place] [address] since your last visit? Include any pap smears or colon screening. No      Patient is accompanied by self I have received verbal consent from Marzena Rogers to discuss any/all medical information while they are present in the room.

## 2021-05-13 NOTE — LETTER
5/13/2021    Patient: Reuben Wood   YOB: 1960   Date of Visit: 5/13/2021     Marvin Chatterjee MD  [address] [facility] [address]  [address] [address]  Via In [address] [place] [address]  Via In [employer]    Dear MD Hernan Kimble MD,      Thank you for referring Ms. Venkata Sorensen to [address] [place] [address] for evaluation. My notes for this consultation are attached. If you have questions, please do not hesitate to call me. I look forward to following your patient along with you.       Sincerely,    Tripp Israel MD

## 2021-05-13 NOTE — H&P (VIEW-ONLY)
Surgery History and Physical 
 
Subjective:  
  
Krystina Sexton is a 59 y.o. female who presents for evaluation of multiple lipomas on her upper extremity. She is lipoma prone and has had them removed before from Dr. Ayaka Bermudez. She wants to get some of them excised because they are getting bigger and starting to bulge out. Past Medical History:  
Diagnosis Date  Asthma  Diabetes ([address]  History of seasonal allergies  Hypertension Past Surgical History:  
Procedure Laterality Date [address] [facility] Family History Problem Relation Age of Onset  Diabetes Mother  Hypertension Mother  Kidney Disease Mother  No Known Problems Father  Cancer Sister Breast  
 Breast Cancer Sister 52  
 Huntingtons disease Maternal Aunt  Huntingtons disease Maternal Grandfather Social History Tobacco Use  Smoking status: Never Smoker  Smokeless tobacco: Never Used Substance Use Topics  Alcohol use: Yes Frequency: Monthly or less Drinks per session: 1 or 2 Binge frequency: Never Prior to Admission medications Medication Sig Start Date End Date Taking? Authorizing Provider  
ciclopirox (PENLAC) 8 % solution APPLY SOLUTION TOPICALLY ONCE DAILY REMOVE ONCE A WEEK 4/20/21  Yes Provider, Historical  
clotrimazole-betamethasone (LOTRISONE) topical cream APPLY CREAM TOPICALLY TO AFFECTED AREA TWICE DAILY 5/3/21  Yes Provider, Historical  
irbesartan (AVAPRO) 75 mg tablet TAKE 1 TABLET BY MOUTH ONCE DAILY 5/10/21  Yes Makenna Baiely MD  
hydroCHLOROthiazide (MICROZIDE) 12.5 mg capsule TAKE 1 CAPSULE BY MOUTH ONCE DAILY 5/10/21  Yes Makenna Bailey MD  
metFORMIN (GLUCOPHAGE) 500 mg tablet TAKE 1 TABLET BY MOUTH TWICE DAILY 2/27/21  Yes Makenna Bailey MD  
budesonide-formoteroL (Symbicort) 160-4.5 mcg/actuation HFAA Take 2 Puffs by inhalation two (2) times a day.    Yes Provider, Historical  
montelukast (Singulair) 10 mg tablet Take 10 mg by mouth daily. Yes Provider, Historical  
cholecalciferol, vitamin D3, 50 mcg (2,000 unit) tab Take  by mouth daily. Yes Provider, Historical  
mometasone (Nasonex) 50 mcg/actuation nasal spray 2 Sprays by Both Nostrils route as needed. Yes Provider, Historical  
multivitamin with iron (DAILY MULTI-VITAMINS/IRON) tablet Take 1 Tab by mouth daily. Yes Provider, Historical  
ibuprofen (MOTRIN) 800 mg tablet Take 1 Tab by mouth every eight (8) hours as needed for Pain. 11/23/16  Yes Felipa Dutton MD  
cetirizine (ZYRTEC) 10 mg tablet Take  by mouth. Provider, Historical  
albuterol (PROVENTIL HFA, VENTOLIN HFA, PROAIR HFA) 90 mcg/actuation inhaler Take 1 Puff by inhalation every four (4) hours as needed for Wheezing. 11/23/16   Felipa Dutton MD  
  
Allergies Allergen Reactions  Latex Swelling  Sulfa (Sulfonamide Antibiotics) Swelling Review of Systems: A comprehensive review of systems was negative except for that written in the History of Present Illness. Objective:  
 
Visit Vitals /77 (BP 1 Location: Left arm, BP Patient Position: Sitting) Pulse 72 Temp 96.8 °F (36 °C) (Oral) Ht 5' 8\" (1.727 m) Wt 106.6 kg (235 lb) SpO2 93% BMI 35.73 kg/m² Physical Exam: 
Physical Exam: 
General:  Alert, cooperative, no distress, appears stated age. Eyes:  Conjunctivae/corneas clear. Ears:  Normal external ear canals both ears. Nose: Nares normal. Septum midline. Mouth/Throat: Lips, mucosa, and tongue normal. Teeth and gums normal.  
Neck: Supple, symmetrical, trachea midline Back:   Symmetric, no curvature. ROM normal.   
Lungs:   Clear to auscultation bilaterally. Heart:  Regular rate and rhythm Abdomen:   Soft, non-tender. Bowel sounds normal. No masses,  No organomegaly. Extremities: Extremities normal, atraumatic, no cyanosis or edema.   
Skin: Multiple lipomas on her bilateral upper extremities, lower extremities, and back Assessment:  
 
59year old female with multiple lipomas Plan:  
 
Discussed the risk of surgery including bleeding, infection, and recurrence,  and the risks of MAC anesthetic. The patient understands the risks; any and all questions were answered to the patient's satisfaction. 
-After discussing with the patient, we will plan on excising her symptomatic lipomas on her bilateral upper extremities 
-Will schedule at the patients earliest convenience The patient was counseled at length about the risks of jessica Covid-19 during their perioperative period and any recovery window from their procedure. The patient was made aware that jessica Covid-19  may worsen their prognosis for recovering from their procedure and lend to a higher morbidity and/or mortality risk. All material risks, benefits, and reasonable alternatives including postponing the procedure were discussed. The patient does  wish to proceed with the procedure at this time.

## 2021-05-17 ENCOUNTER — HOSPITAL ENCOUNTER (OUTPATIENT)
Dept: NUTRITION | Age: 61
Discharge: HOME | End: 2021-05-17
Payer: MEDICAID

## 2021-05-17 DIAGNOSIS — E66.1 DRUG-INDUCED OBESITY, UNSPECIFIED CLASSIFICATION, UNSPECIFIED WHETHER SERIOUS COMORBIDITY PRESENT: ICD-10-CM

## 2021-05-17 PROCEDURE — 97803 MED NUTRITION INDIV SUBSEQ: CPT | Performed by: DIETITIAN, REGISTERED

## 2021-05-17 NOTE — PROGRESS NOTES
NUTRITION - FOLLOW-UP TREATMENT NOTE  Patient Name: Anthony Taylor         Date: 2021  : 1960    YES Patient  Verified  Diagnosis: E66.01 (ICD-10-CM) - Morbid obesity ([facility]   In time:   9:30am        Out time:   10:00am   Total Treatment Time (min): 30     SUBJECTIVE/ASSESSMENT  Current Wt: 234.8 Previous Wt: 238 Wt Change: -3.2     Initial Wt: 250 Total Wt change: -15.2 Height: 68     Past Medical History:   Diagnosis Date    Asthma     Diabetes ([facility]     History of seasonal allergies     Hypertension      Changes in medication or medical history? Any new allergies, surgeries or procedures? Yes - labs  If yes, update Summary List   Lab Results   Component Value Date/Time    Sodium 143 05/10/2021 10:48 AM    Potassium 4.3 05/10/2021 10:48 AM    Chloride 109 (H) 05/10/2021 10:48 AM    CO2 26 05/10/2021 10:48 AM    Anion gap 8 05/10/2021 10:48 AM    Glucose 109 (H) 05/10/2021 10:48 AM    BUN 14 05/10/2021 10:48 AM    Creatinine 0.85 05/10/2021 10:48 AM    BUN/Creatinine ratio 16 05/10/2021 10:48 AM    GFR est AA >60 05/10/2021 10:48 AM    GFR est non-AA >60 05/10/2021 10:48 AM    Calcium 9.8 05/10/2021 10:48 AM    Bilirubin, total 0.3 05/10/2021 10:48 AM    Alk. phosphatase 95 05/10/2021 10:48 AM    Protein, total 7.7 05/10/2021 10:48 AM    Albumin 4.0 05/10/2021 10:48 AM    Globulin 3.7 05/10/2021 10:48 AM    A-G Ratio 1.1 05/10/2021 10:48 AM    ALT (SGPT) 43 05/10/2021 10:48 AM    AST (SGOT) 24 05/10/2021 10:48 AM     Lab Results   Component Value Date/Time    Hemoglobin A1c 6.2 (H) 05/10/2021 10:48 AM            Nutrition Diagnosis        Diagnosis Status: obesity R/T excessive energy intake AEB BMI of 38 and dietary recall showing mindless snacking at night and during the day.   [x]  Improved []  No Change    []  Declined   []  Discontinued      Physical inactivity R/T emotional barriers AEB pt report of fears of COVID reducing her overall activity level and movement  []  Improved [x]  No Change    []  Declined   []  Discontinued      Food and nutrition related knowledge deficit R/T lack of prior education for healthy weight loss AEB request for counseling  [x]  Improved []  No Change    []  Declined   []  Discontinued        Nutrition Monitoring and Evaluation: New labs reviewed. Stable A1C in prediabetic range. Previously diagnosed with diabetes. Pt is disappointed with lack of improvement. Notes encouraged to increase exercise as has been lower than intended. Tracking on food michael. Wants to focus on trying new veggies and fruit. Tried quinoa and papaya. Less organized walking. More gardening. Able to hit   Enjoying making different types of salad. Fasting on Mondays. Pt notes she has always done this for past 20+ years. Some days on michael showing <1200kcal per day and <50g protein intake compared to goal of 0.8g/kg protein = 85g    Likely some foods not being tracked fully or measured on michael. Pt continues to have late night eating patterns. Not interested in adjusting times of eating. Low intake of added sugars. Below carbohydrate targets most days according to michael. Pt feels she is eating well, no issues with hunger, improved energy and feels she can continue changes without issue . Previous goals:  Met. Continued. -continue tracking 3 days per week. Can use michael instead of paper to help. Not met. Revised. -continuing exercise routine with walking 3x/week for 30 minutes + 2 exercise videos/day with daughter      Nutrition Prescription and  Intervention CBT   Self monitoring - michael food log. Exercise goals to work towards 150min of activity per week minimum. Reviewed labs with pt. A1C is consistent. Very well controlled Diabetes. She was disappointed with A1C not lowering, but discussed how well controlled blood sugars, weight loss, and exercise are supporting delayed disease progression.    Educated for protein needs *0.8g/kg/bw = 85g daily       Patient Education:  [x]  Review current plan with patient   []  Other:    Handouts/  Information Provided: []  Carbohydrates  []  Protein  []  Fiber  []  Serving Sizes  []  Fluids  []  General guidelines []  Diabetes  []  Cholesterol  []  Sodium  []  SBGM  []  Food Journals  []  Others:      Patient Goals -continue tracking 3 days per week. Can use michael instead of paper to help.   -return to walking 3 mornings per week + additional activity on other days such as gardening.   -try 1 new fruit/vetable per week to increase diversity of food choices. Recipes provided through [employer]  -increase protein intake to 85g per day.       PLAN  [x]  Continue on current plan []  Follow-up PRN   []  Discharge due to :    [x]  Next appt: 4 weeks     Dietitian: Joni Jang MS, CHRISTIANO, ROLANDO    Date: 5/17/2021 Time: 10:30 AM

## 2021-05-17 NOTE — PERIOP NOTES
[facility]  Preoperative Instructions    Surgery Date 5/26/21          Time of Arrival 1115  Contact # [phone] home    1. On the day of your surgery, please report to the Surgical Services Registration Desk and sign in at your designated time. The Surgery Center is located to the right of the Emergency Room. 2. You must have someone with you to drive you home. You should not drive a car for 24 hours following surgery. Please make arrangements for a friend or family member to stay with you for the first 24 hours after your surgery. 3. Do not have anything to eat or drink (including water, gum, mints, coffee, juice) after midnight 5/25/21 . ? This may not apply to medications prescribed by your physician. ?(Please note below the special instructions with medications to take the morning of your procedure.)    4. We recommend you do not drink any alcoholic beverages for 24 hours before and after your surgery. 5. Contact your surgeons office for instructions on the following medications: non-steroidal anti-inflammatory drugs (i.e. Advil, Aleve), vitamins, and supplements. (Some surgeons will want you to stop these medications prior to surgery and others may allow you to take them)  **If you are currently taking Plavix, Coumadin, Aspirin and/or other blood-thinning agents, contact your surgeon for instructions. ** Your surgeon will partner with the physician prescribing these medications to determine if it is safe to stop or if you need to continue taking. Please do not stop taking these medications without instructions from your surgeon    6. Wear comfortable clothes. Wear glasses instead of contacts. Do not bring any money or jewelry. Please bring picture ID, insurance card, and any prearranged co-payment or hospital payment. Do not wear make-up, particularly mascara the morning of your surgery. Do not wear nail polish, particularly if you are having foot /hand surgery.   Wear your hair loose or down, no ponytails, buns, peter pins or clips. All body piercings must be removed. Please shower with antibacterial soap for three consecutive days before and on the morning of surgery, but do not apply any lotions, powders or deodorants after the shower on the day of surgery. Please use a fresh towels after each shower. Please sleep in clean clothes and change bed linens the night before surgery. Please do not shave for 48 hours prior to surgery. Shaving of the face is acceptable. 7. You should understand that if you do not follow these instructions your surgery may be cancelled. If your physical condition changes (I.e. fever, cold or flu) please contact your surgeon as soon as possible. 8. It is important that you be on time. If a situation occurs where you may be late, please call [phone] (OR Holding Area). 9. If you have any questions and or problems, please call [phone] (Pre-admission Testing). 10. Your surgery time may be subject to change. You will receive a phone call the evening prior if your time changes. 11.  If having outpatient surgery, you must have someone to drive you here, stay with you during the duration of your stay, and to drive you home at time of discharge. Special Instructions:   Covid test scheduled Saturday 5/22/21 at [id number] AM.  Directions/location given to patient, patient advised to self-quarantine after test and up to day of surgery. TAKE ALL MEDICATIONS DAY OF SURGERY EXCEPT: Irbesartan, Metformin    I understand a pre-operative phone call will be made to verify my surgery time. In the event that I am not available, I give permission for a message to be left on my answering service and/or with another person?   yes         ___________________      __________   5/17/21 @ 0442    (Signature of Patient)             (Witness)                (Date and Time)

## 2021-05-18 NOTE — PERIOP NOTES
Spoke with Manolo Quijano in MD office. Per Dr. Braeden Bermudez patient does not need preop EKG as ordered.

## 2021-05-22 ENCOUNTER — HOSPITAL ENCOUNTER (OUTPATIENT)
Dept: PREADMISSION TESTING | Age: 61
Discharge: HOME | End: 2021-05-22
Payer: MEDICAID

## 2021-05-22 PROCEDURE — U0005 INFEC AGEN DETEC AMPLI PROBE: HCPCS

## 2021-05-26 ENCOUNTER — ANESTHESIA (OUTPATIENT)
Dept: SURGERY | Age: 61
End: 2021-05-26
Payer: MEDICAID

## 2021-05-26 ENCOUNTER — ANESTHESIA EVENT (OUTPATIENT)
Dept: SURGERY | Age: 61
End: 2021-05-26
Payer: MEDICAID

## 2021-05-26 ENCOUNTER — HOSPITAL ENCOUNTER (OUTPATIENT)
Age: 61
Setting detail: OUTPATIENT SURGERY
Discharge: HOME | End: 2021-05-26
Attending: SURGERY | Admitting: SURGERY
Payer: MEDICAID

## 2021-05-26 VITALS
HEART RATE: 56 BPM | WEIGHT: 232.81 LBS | OXYGEN SATURATION: 100 % | TEMPERATURE: 98 F | HEIGHT: 69 IN | DIASTOLIC BLOOD PRESSURE: 76 MMHG | BODY MASS INDEX: 34.48 KG/M2 | RESPIRATION RATE: 16 BRPM | SYSTOLIC BLOOD PRESSURE: 155 MMHG

## 2021-05-26 DIAGNOSIS — D17.20 LIPOMA OF UPPER EXTREMITY, UNSPECIFIED LATERALITY: Primary | ICD-10-CM

## 2021-05-26 LAB
GLUCOSE BLD STRIP.AUTO-MCNC: 107 MG/DL (ref 65–117)
SERVICE CMNT-IMP: NORMAL

## 2021-05-26 PROCEDURE — 74011250636 HC RX REV CODE- 250/636: Performed by: ANESTHESIOLOGY

## 2021-05-26 PROCEDURE — 82962 GLUCOSE BLOOD TEST: CPT

## 2021-05-26 PROCEDURE — 74011250636 HC RX REV CODE- 250/636: Performed by: SURGERY

## 2021-05-26 PROCEDURE — 74011000250 HC RX REV CODE- 250: Performed by: SURGERY

## 2021-05-26 PROCEDURE — 88304 TISSUE EXAM BY PATHOLOGIST: CPT

## 2021-05-26 PROCEDURE — 77030002933 HC SUT MCRYL J&J -A: Performed by: SURGERY

## 2021-05-26 PROCEDURE — 25071 EXC FOREARM LES SC 3 CM/>: CPT | Performed by: SURGERY

## 2021-05-26 PROCEDURE — 76210000021 HC REC RM PH II 0.5 TO 1 HR: Performed by: SURGERY

## 2021-05-26 PROCEDURE — 74011250637 HC RX REV CODE- 250/637: Performed by: SURGERY

## 2021-05-26 PROCEDURE — 76060000034 HC ANESTHESIA 1.5 TO 2 HR: Performed by: SURGERY

## 2021-05-26 PROCEDURE — 25075 EXC FOREARM LES SC < 3 CM: CPT | Performed by: SURGERY

## 2021-05-26 PROCEDURE — 77030002986 HC SUT PROL J&J -A: Performed by: SURGERY

## 2021-05-26 PROCEDURE — 76010000153 HC OR TIME 1.5 TO 2 HR: Performed by: SURGERY

## 2021-05-26 PROCEDURE — 77030031139 HC SUT VCRL2 J&J -A: Performed by: SURGERY

## 2021-05-26 PROCEDURE — 77030011267 HC ELECTRD BLD COVD -A: Performed by: SURGERY

## 2021-05-26 PROCEDURE — 74011250636 HC RX REV CODE- 250/636: Performed by: NURSE ANESTHETIST, CERTIFIED REGISTERED

## 2021-05-26 PROCEDURE — 2709999900 HC NON-CHARGEABLE SUPPLY: Performed by: SURGERY

## 2021-05-26 PROCEDURE — 76210000006 HC OR PH I REC 0.5 TO 1 HR: Performed by: SURGERY

## 2021-05-26 PROCEDURE — 77030010507 HC ADH SKN DERMBND J&J -B: Performed by: SURGERY

## 2021-05-26 RX ORDER — FENTANYL CITRATE 50 UG/ML
INJECTION, SOLUTION INTRAMUSCULAR; INTRAVENOUS AS NEEDED
Status: DISCONTINUED | OUTPATIENT
Start: 2021-05-26 | End: 2021-05-26 | Stop reason: HOSPADM

## 2021-05-26 RX ORDER — HYDROCODONE BITARTRATE AND ACETAMINOPHEN 5; 300 MG/1; MG/1
1 TABLET ORAL
Qty: 15 TABLET | Refills: 0 | Status: SHIPPED | OUTPATIENT
Start: 2021-05-26 | End: 2021-05-29

## 2021-05-26 RX ORDER — ONDANSETRON 2 MG/ML
4 INJECTION INTRAMUSCULAR; INTRAVENOUS AS NEEDED
Status: DISCONTINUED | OUTPATIENT
Start: 2021-05-26 | End: 2021-05-26 | Stop reason: HOSPADM

## 2021-05-26 RX ORDER — DIPHENHYDRAMINE HYDROCHLORIDE 50 MG/ML
12.5 INJECTION, SOLUTION INTRAMUSCULAR; INTRAVENOUS AS NEEDED
Status: DISCONTINUED | OUTPATIENT
Start: 2021-05-26 | End: 2021-05-26 | Stop reason: HOSPADM

## 2021-05-26 RX ORDER — HYDROCODONE BITARTRATE AND ACETAMINOPHEN 5; 325 MG/1; MG/1
1 TABLET ORAL
Status: COMPLETED | OUTPATIENT
Start: 2021-05-26 | End: 2021-05-26

## 2021-05-26 RX ORDER — FENTANYL CITRATE 50 UG/ML
25 INJECTION, SOLUTION INTRAMUSCULAR; INTRAVENOUS
Status: DISCONTINUED | OUTPATIENT
Start: 2021-05-26 | End: 2021-05-26 | Stop reason: HOSPADM

## 2021-05-26 RX ORDER — FENTANYL CITRATE 50 UG/ML
50 INJECTION, SOLUTION INTRAMUSCULAR; INTRAVENOUS AS NEEDED
Status: DISCONTINUED | OUTPATIENT
Start: 2021-05-26 | End: 2021-05-26 | Stop reason: HOSPADM

## 2021-05-26 RX ORDER — SODIUM CHLORIDE 0.9 % (FLUSH) 0.9 %
5-40 SYRINGE (ML) INJECTION EVERY 8 HOURS
Status: DISCONTINUED | OUTPATIENT
Start: 2021-05-26 | End: 2021-05-26 | Stop reason: HOSPADM

## 2021-05-26 RX ORDER — SODIUM CHLORIDE 0.9 % (FLUSH) 0.9 %
5-40 SYRINGE (ML) INJECTION AS NEEDED
Status: DISCONTINUED | OUTPATIENT
Start: 2021-05-26 | End: 2021-05-26 | Stop reason: HOSPADM

## 2021-05-26 RX ORDER — MIDAZOLAM HYDROCHLORIDE 1 MG/ML
INJECTION, SOLUTION INTRAMUSCULAR; INTRAVENOUS AS NEEDED
Status: DISCONTINUED | OUTPATIENT
Start: 2021-05-26 | End: 2021-05-26 | Stop reason: HOSPADM

## 2021-05-26 RX ORDER — HYDROMORPHONE HYDROCHLORIDE 1 MG/ML
.2-.5 INJECTION, SOLUTION INTRAMUSCULAR; INTRAVENOUS; SUBCUTANEOUS
Status: DISCONTINUED | OUTPATIENT
Start: 2021-05-26 | End: 2021-05-26 | Stop reason: HOSPADM

## 2021-05-26 RX ORDER — BUPIVACAINE HYDROCHLORIDE AND EPINEPHRINE 5; 5 MG/ML; UG/ML
INJECTION, SOLUTION EPIDURAL; INTRACAUDAL; PERINEURAL AS NEEDED
Status: DISCONTINUED | OUTPATIENT
Start: 2021-05-26 | End: 2021-05-26 | Stop reason: HOSPADM

## 2021-05-26 RX ORDER — MIDAZOLAM HYDROCHLORIDE 1 MG/ML
0.5 INJECTION, SOLUTION INTRAMUSCULAR; INTRAVENOUS
Status: DISCONTINUED | OUTPATIENT
Start: 2021-05-26 | End: 2021-05-26 | Stop reason: HOSPADM

## 2021-05-26 RX ORDER — PROPOFOL 10 MG/ML
INJECTION, EMULSION INTRAVENOUS
Status: DISCONTINUED | OUTPATIENT
Start: 2021-05-26 | End: 2021-05-26 | Stop reason: HOSPADM

## 2021-05-26 RX ORDER — SODIUM CHLORIDE, SODIUM LACTATE, POTASSIUM CHLORIDE, CALCIUM CHLORIDE 600; 310; 30; 20 MG/100ML; MG/100ML; MG/100ML; MG/100ML
25 INJECTION, SOLUTION INTRAVENOUS CONTINUOUS
Status: DISCONTINUED | OUTPATIENT
Start: 2021-05-26 | End: 2021-05-26 | Stop reason: HOSPADM

## 2021-05-26 RX ORDER — LIDOCAINE HYDROCHLORIDE 10 MG/ML
0.1 INJECTION, SOLUTION EPIDURAL; INFILTRATION; INTRACAUDAL; PERINEURAL AS NEEDED
Status: DISCONTINUED | OUTPATIENT
Start: 2021-05-26 | End: 2021-05-26 | Stop reason: HOSPADM

## 2021-05-26 RX ORDER — MORPHINE SULFATE 2 MG/ML
2 INJECTION, SOLUTION INTRAMUSCULAR; INTRAVENOUS
Status: DISCONTINUED | OUTPATIENT
Start: 2021-05-26 | End: 2021-05-26 | Stop reason: HOSPADM

## 2021-05-26 RX ORDER — PROPOFOL 10 MG/ML
INJECTION, EMULSION INTRAVENOUS AS NEEDED
Status: DISCONTINUED | OUTPATIENT
Start: 2021-05-26 | End: 2021-05-26 | Stop reason: HOSPADM

## 2021-05-26 RX ADMIN — HYDROCODONE BITARTRATE AND ACETAMINOPHEN 1 TABLET: 5; 325 TABLET ORAL at 16:28

## 2021-05-26 RX ADMIN — FENTANYL CITRATE 25 MCG: 50 INJECTION, SOLUTION INTRAMUSCULAR; INTRAVENOUS at 16:12

## 2021-05-26 RX ADMIN — Medication 3 AMPULE: at 12:00

## 2021-05-26 RX ADMIN — SODIUM CHLORIDE, POTASSIUM CHLORIDE, SODIUM LACTATE AND CALCIUM CHLORIDE: 600; 310; 30; 20 INJECTION, SOLUTION INTRAVENOUS at 15:25

## 2021-05-26 RX ADMIN — WATER 2 G: 1 INJECTION INTRAMUSCULAR; INTRAVENOUS; SUBCUTANEOUS at 14:19

## 2021-05-26 RX ADMIN — PROPOFOL 30 MG: 10 INJECTION, EMULSION INTRAVENOUS at 14:19

## 2021-05-26 RX ADMIN — PROPOFOL 100 MCG/KG/MIN: 10 INJECTION, EMULSION INTRAVENOUS at 14:19

## 2021-05-26 RX ADMIN — SODIUM CHLORIDE, POTASSIUM CHLORIDE, SODIUM LACTATE AND CALCIUM CHLORIDE 25 ML/HR: 600; 310; 30; 20 INJECTION, SOLUTION INTRAVENOUS at 12:32

## 2021-05-26 RX ADMIN — MIDAZOLAM HYDROCHLORIDE 2 MG: 1 INJECTION, SOLUTION INTRAMUSCULAR; INTRAVENOUS at 14:12

## 2021-05-26 RX ADMIN — FENTANYL CITRATE 25 MCG: 50 INJECTION, SOLUTION INTRAMUSCULAR; INTRAVENOUS at 14:19

## 2021-05-26 NOTE — ANESTHESIA PREPROCEDURE EVALUATION
Relevant Problems   RESPIRATORY SYSTEM   (+) Uncomplicated asthma       Anesthetic History   No history of anesthetic complications            Review of Systems / Medical History  Patient summary reviewed, nursing notes reviewed and pertinent labs reviewed    Pulmonary            Asthma        Neuro/Psych   Within defined limits           Cardiovascular    Hypertension              Exercise tolerance: <4 METS     GI/Hepatic/Renal  Within defined limits              Endo/Other    Diabetes    Morbid obesity     Other Findings   Comments: LIPOMAS  Vitamin D deficiency           Physical Exam    Airway  Mallampati: I    Neck ROM: normal range of motion   Mouth opening: Normal     Cardiovascular  Regular rate and rhythm,  S1 and S2 normal,  no murmur, click, rub, or gallop             Dental    Dentition: Edentulous, Full lower dentures and Full upper dentures     Pulmonary  Breath sounds clear to auscultation               Abdominal  GI exam deferred       Other Findings            Anesthetic Plan    ASA: 3  Anesthesia type: MAC          Induction: Intravenous  Anesthetic plan and risks discussed with: Patient

## 2021-05-26 NOTE — PERIOP NOTES
Handoff Report from Operating Room to PACU    Report received from Nolia Crigler, RN and Maxwell Jesus CRNA regarding Lavern Bowman. Surgeon(s):  Jolynn Martinez MD  And Procedure(s) (LRB):  EXCISION MULTIPLE LIPOMAS UPPER EXTREMITY (Bilateral)  confirmed   with allergies and dressings discussed. Anesthesia type, drugs, patient history, complications, estimated blood loss, vital signs, intake and output, and last pain medication, lines and temperature were reviewed. 4:40 PM  Pt declined having blood sugar taken at this time,  at 1230 today. 4:45 PM  Report off to [employer]. Pt transferred to phase II.

## 2021-05-26 NOTE — DISCHARGE INSTRUCTIONS
Dr. Garry Dewitt Discharge Instructions for:  Eric Michel    MRN: [id number] : 1960    Admitted: 2021  Discharged: 2021       What to do at Home    Recommended diet: Resume your normal diet    Recommended activity: as tolerated    Follow-up with Dr. Garry Dewitt in 1-2 weeks. Call [phone] for an appointment. Wound Care:  · Replace outer gauze with new dry gauze daily starting today to protect the \"glue\". · See additional instructions below: How to Care for Your Wound After Its Treated With DERMABOND* Topical Skin Adhesive    DERMABOND* Topical Skin Adhesive (2-octyl cyanoacrylate) is a sterile, liquid skin adhesivethat holds wound edges together. The film will usually remain in place for 5 to 10 days, thennaturally fall off your skin. The following will answer some of your questions and provide instructions for proper care for yourwound while it is healing:    CHECK WOUND APPEARANCE   Some swelling, redness, and pain are common with all wounds and normally will go away as the wound heals. If swelling, redness, or pain increases or if the wound feels warm to the touch, contact a doctor. Also contact a doctor if the wound edges reopen or separate. REPLACE BANDAGES   If your wound is bandaged, keep the bandage dry.  Replace the dressing daily until the adhesive film has fallen off or if the bandage should become wet, unless otherwise instructed by your physician.  When changing the dressing, do not place tape directly over the DERMABOND adhesive film, because removing the tape later may also remove the film. AVOID TOPICAL MEDICATIONS   Do not apply liquid or ointment medications or any other product to your wound while the DERMABOND adhesive film is in place. These may loosen the film before your wound is healed. KEEP WOUND DRY AND PROTECTED   You may occasionally and briefly wet your wound in the shower or bath.  Do not soak or scrub your wound, do not swim, and avoid periods of heavy perspiration until the DERMABOND adhesive has naturally fallen off. After showering or bathing, gently blot your wound dry with a soft towel. If a protective dressing is being used, apply a fresh, dry bandage, being sure to keep the tape off the DERMABOND adhesive film.  Apply a clean, dry bandage over the wound if necessary to protect it.  Protect your wound from injury until the skin has had sufficient time to heal.   Do not scratch, rub, or pick at the DERMABOND adhesive film. This may loosen the film before your wound is healed.  Protect the wound from prolonged exposure to sunlight or tanning lamps while the film is in place. TO PREVENT AN INFECTION      1. [address] YOUR HANDS     To prevent infection, good handwashing is the most important thing you or your caregiver can do.  Wash your hands with soap and water or use the hand  we gave you before you touch any wounds. 2. SHOWER     Use the antibacterial soap we gave you when you take a shower.  Shower with this soap until your wounds are healed.  To reach all areas of your body, you may need someone to help you.  Dont forget to clean your belly button with every shower. 3.  USE CLEAN SHEETS     Use freshly cleaned sheets on your bed after surgery.  To keep the surgery site clean, do not allow pets to sleep with you while your wound is still healing. 4. STOP SMOKING     Stop smoking, or at least cut back on smoking     Smoking slows your healing. 5.  CONTROL YOUR BLOOD SUGAR     High blood sugars slow wound healing. If you are diabetic, control your blood sugar levels before and after your surgery. How to Care for Your Wound After Its Treated With  DERMABOND* Topical Skin Adhesive  DERMABOND* Topical Skin Adhesive (2-octyl cyanoacrylate) is a sterile, liquid skin adhesive  that holds wound edges together.  The film will usually remain in place for 5 to 10 days, then  naturally fall off your skin. The following will answer some of your questions and provide instructions for proper care for your  wound while it is healing:    CHECK WOUND APPEARANCE   Some swelling, redness, and pain are common with all wounds and normally will go away as the  wound heals. If swelling, redness, or pain increases or if the wound feels warm to the touch,  contact a doctor. Also contact a doctor if the wound edges reopen or separate. REPLACE BANDAGES   If your wound is bandaged, keep the bandage dry.  Replace the dressing daily until the adhesive film has fallen off or if the  bandage should become wet, unless otherwise instructed by your  physician.  When changing the dressing, do not place tape directly over the  DERMABOND adhesive film, because removing the tape later may also  remove the film. AVOID TOPICAL MEDICATIONS   Do not apply liquid or ointment medications or any other product to your wound while the  DERMABOND adhesive film is in place. These may loosen the film before your wound is healed. KEEP WOUND DRY AND PROTECTED   You may occasionally and briefly wet your wound in the shower or bath. Do not soak or scrub  your wound, do not swim, and avoid periods of heavy perspiration until the DERMABOND  adhesive has naturally fallen off. After showering or bathing, gently blot your wound dry with a  soft towel. If a protective dressing is being used, apply a fresh, dry bandage, being sure to keep  the tape off the DERMABOND adhesive film.  Apply a clean, dry bandage over the wound if necessary to protect it.  Protect your wound from injury until the skin has had sufficient time to heal.   Do not scratch, rub, or pick at the DERMABOND adhesive film. This may loosen the film before  your wound is healed.  Protect the wound from prolonged exposure to sunlight or tanning lamps while the film is in  place.   If you have any questions or concerns about this product, please consult your doctor. *Trademark ©[employer] 2002DISCHARGE SUMMARY from Nurse    The following personal items are in your possession at time of discharge:    Dental Appliances: None  Visual Aid: None  Home Medications: None  Jewelry: None  Clothing: None (left in in pt. room)  Other Valuables: None             PATIENT INSTRUCTIONS:    After general anesthesia or intravenous sedation, for 24 hours or while taking prescription Narcotics:  Limit your activities  Do not drive and operate hazardous machinery  Do not make important personal or business decisions  Do  not drink alcoholic beverages  If you have not urinated within 8 hours after discharge, please contact your surgeon on call. Report the following to your surgeon:  Excessive pain, swelling, redness or odor of or around the surgical area  Temperature over 100.5  Nausea and vomiting lasting longer than 4 hours or if unable to take medications  Any signs of decreased circulation or nerve impairment to extremity: change in color, persistent  numbness, tingling, coldness or increase pain  Any questions    To prevent infection remember to refer to your handout on handwashing given to you by your nurse. *  Please give a list of your current medications to your Primary Care Provider. *  Please update this list whenever your medications are discontinued, doses are      changed, or new medications (including over-the-counter products) are added. *  Please carry medication information at all times in case of emergency situations. These are general instructions for a healthy lifestyle:    No smoking/ No tobacco products/ Avoid exposure to second hand smoke    Surgeon General's Warning:  Quitting smoking now greatly reduces serious risk to your health.     Obesity, smoking, and sedentary lifestyle greatly increases your risk for illness    A healthy diet, regular physical exercise & weight monitoring are important for maintaining a healthy lifestyle    You may be retaining fluid if you have a history of heart failure or if you experience any of the following symptoms:  Weight gain of 3 pounds or more overnight or 5 pounds in a week, increased swelling in our hands or feet or shortness of breath while lying flat in bed. Please call your doctor as soon as you notice any of these symptoms; do not wait until your next office visit. Recognize signs and symptoms of STROKE:    F-face looks uneven    A-arms unable to move or move unevenly    S-speech slurred or non-existent    T-time-call 911 as soon as signs and symptoms begin-DO NOT go       Back to bed or wait to see if you get better-TIME IS BRAIN. The discharge information has been reviewed with the patient. The patient verbalized understanding. Patient Education      Hydrocodone/Acetaminophen (Vicodin, Norco, Lortab) - (By mouth)   Why this medicine is used:   Treats pain. Contact a nurse or doctor right away if you have:  Blistering, peeling, red skin rash  Fast or slow heartbeat, shallow breathing, blue lips, fingernails, or skin  Anxiety, restlessness, muscle spasms, twitching, seeing or hearing things that are not there  Dark urine or pale stools, yellow skin or eyes  Extreme weakness, sweating, seizures, cold or clammy skin  Lightheadedness, dizziness, fainting, fever, sweating     Common side effects:  Constipation, nausea, vomiting, loss of appetite, stomach pain  Tiredness or sleepiness  © 2017 [facility] Information is for End User's use only and may not be sold, redistributed or otherwise used for commercial purposes.

## 2021-05-26 NOTE — OP NOTES
Operative Report    Patient: Jacquelin Petit MRN: [id number]  SSN: [id number]    YOB: 1960  Age: 59 y.o. Sex: female       Date of Surgery: 5/26/2021     Preoperative Diagnosis: Bilateral upper extremity lipomas    Postoperative Diagnosis: Bilateral upper extremity lipomas    Surgeon(s) and Role:     * Ida Mejia MD - Primary    Anesthesia: MAC     Procedure: Procedure(s):  1. Excision of 7 cm x 4 cm right proximal anterior arm lipoma  2. Excision of 2 cm x 1 cm right distal anterior forearm lipoma  3. Excision of 1 cm x 1 cm right distal posterior forearm lipoma  4. Excision of 4 cm x 4 cm right proximal posterior arm lipoma  5. Excision of 4 cm x 2 cm left distal forearm lipoma  6. Excision of 1 cm x 1 cm left proximal forearm lipomas x 3    Procedure in Detail: The patient was brought to the operating room and placed in supine position. MAC anesthesia was induced. Bilateral upper extremities were marked preoperatively to address the lipomas of interest. The area was prepped and draped in the usual sterile fashion. 1% marcaine with epinephrine was infiltrated into the skin and soft tissue surrounding the mass. An incision was made. The mass extended through the superficial fascia, but was not involving the deep fascia or muscle. The mass was fatty, consistent with a lipoma. It was bluntly dissected free of surrounding tissues and excised in its entirety and send for pathology. Hemostasis was noted. The superficial fascia was closed with interrupted 3-0 Vicryl; followed by closure of the skin with 4-0 Monocryl. The wound was covered with Dermabond. This was repeated for the subsequent lipomas that were removed. The patient tolerated the procedure well. The patient was brought to PACU in stable condition.     Estimated Blood Loss:  Minimal    Tourniquet Time: * No tourniquets in log *      Implants: * No implants in log *            Specimens:   ID Type Source Tests Collected by Time Destination   1 : Right distal anterior forearm Preservative Lipoma  Magaly Romo MD 5/26/2021 1453 Pathology   2 : Right distal posterior forearm Preservative Lipoma  Magaly Romo MD 5/26/2021 1500 Pathology   3 : Right distal posterior forearm Preservative Lipoma  Magaly Romo MD 5/26/2021 1515 Pathology   4 : left distal forearm Preservative Lipoma  Melonie Elizondo MD 5/26/2021 1516 Pathology   5 : left proximal forearm Preservative Lipoma  Melonie Elizondo MD 5/26/2021 1518 Pathology   6 : Right proximal anterior arm Preservative Lipoma  Melonie Elizondo MD 5/26/2021 1527 Pathology           Drains: None                Complications: None    Counts: Sponge and needle counts were correct times two.     Signed By:  Theresa Rose MD     May 26, 2021

## 2021-05-26 NOTE — INTERVAL H&P NOTE
Update History & Physical    The Patient's History and Physical was reviewed with the patient and I examined the patient. There was no change. The surgical site was confirmed by the patient and me. Plan:  The risk, benefits, expected outcome, and alternative to the recommended procedure have been discussed with the patient. Patient understands and wants to proceed with the procedure.     Electronically signed by Ruth Simon MD on 5/26/2021 at 2:01 PM

## 2021-05-26 NOTE — PERIOP NOTES
[address] TEST RESULTED NEG - PT STATES IS FULLY VACCINATED FOR COVID - 03/2021. PT DENIES S/S OF COVID - NO FEVER, COLD, COUGH, SOB, N/V, DIARRHEA. ... PRE-OP TCHING DONE PT VERBALIZES UNDERSTANDING. STRETCHER IN LOWEST POSITION, CB IN PLACE AND SR UP X2.

## 2021-05-26 NOTE — ANESTHESIA POSTPROCEDURE EVALUATION
Procedure(s):  EXCISION MULTIPLE LIPOMAS UPPER EXTREMITY. MAC    Anesthesia Post Evaluation        Patient location during evaluation: PACU  Note status: Adequate. Level of consciousness: responsive to verbal stimuli and sleepy but conscious  Pain management: satisfactory to patient  Airway patency: patent  Anesthetic complications: no  Cardiovascular status: acceptable  Respiratory status: acceptable  Hydration status: acceptable  Comments: +Post-Anesthesia Evaluation and Assessment    Patient: Frances Arango MRN: [id number]  SSN: [id number]   YOB: 1960  Age: 59 y.o. Sex: female      Cardiovascular Function/Vital Signs    BP (!) 142/75   Pulse (!) 55   Temp 36.2 °C (97.2 °F)   Resp 17   Ht 5' 8.5\" (1.74 m)   Wt 105.6 kg (232 lb 12.9 oz)   SpO2 100%   BMI 34.88 kg/m²     Patient is status post Procedure(s):  EXCISION MULTIPLE LIPOMAS UPPER EXTREMITY. Nausea/Vomiting: Controlled. Postoperative hydration reviewed and adequate. Pain:  Pain Scale 1: Visual (05/26/21 1630)  Pain Intensity 1: 0 (05/26/21 1630)   Managed. Neurological Status:   Neuro (WDL): Within Defined Limits (05/26/21 1600)   At baseline. Mental Status and Level of Consciousness: Arousable. Pulmonary Status:   O2 Device: Nasal cannula (05/26/21 1600)   Adequate oxygenation and airway patent. Complications related to anesthesia: None    Post-anesthesia assessment completed. No concerns. Signed By: Carmen Banda MD    5/26/2021  Post anesthesia nausea and vomiting:  controlled      INITIAL Post-op Vital signs:   Vitals Value Taken Time   /75 05/26/21 1630   Temp 36.2 °C (97.2 °F) 05/26/21 1600   Pulse 48 05/26/21 1645   Resp 19 05/26/21 1645   SpO2 100 % 05/26/21 1645   Vitals shown include unvalidated device data.

## 2021-06-02 ENCOUNTER — OFFICE VISIT (OUTPATIENT)
Dept: SURGERY | Age: 61
End: 2021-06-02
Payer: COMMERCIAL

## 2021-06-02 VITALS
OXYGEN SATURATION: 96 % | BODY MASS INDEX: 35.31 KG/M2 | SYSTOLIC BLOOD PRESSURE: 120 MMHG | TEMPERATURE: 96.8 F | DIASTOLIC BLOOD PRESSURE: 71 MMHG | HEIGHT: 68 IN | HEART RATE: 87 BPM | WEIGHT: 233 LBS

## 2021-06-02 DIAGNOSIS — D17.20 LIPOMA OF UPPER EXTREMITY, UNSPECIFIED LATERALITY: Primary | ICD-10-CM

## 2021-06-02 PROCEDURE — 99024 POSTOP FOLLOW-UP VISIT: CPT | Performed by: SURGERY

## 2021-06-02 NOTE — PROGRESS NOTES
Subjective:      Gladis Willard is a 59 y.o. female presents for postop care s/p excision of multiple lipomas on bilateral upper extremities. Patient reports no issues. Pain controlled. Objective:     Visit Vitals  /71 (BP 1 Location: Left arm, BP Patient Position: Sitting)   Pulse 87   Temp 96.8 °F (36 °C) (Oral)   Ht 5' 8\" (1.727 m)   Wt 105.7 kg (233 lb)   SpO2 96%   BMI 35.43 kg/m²       General:  alert, cooperative, no distress, appears stated age   Incision:   healing well, no drainage, no erythema, no seroma, no swelling, no dehiscence, incision well approximated     Assessment:     Doing well postoperatively. Plan:     1. Continue any current medications. 2. Wound care discussed.   3. Follow up PRN

## 2021-06-02 NOTE — LETTER
6/2/2021    Patient: Gretchen Najera   YOB: 1960   Date of Visit: 6/2/2021     Ignacio Stewart MD  [address]  [address] [address]  Via In [address] [place] [address]  Via In [employer]    Dear MD Ángel Sol MD,      Thank you for referring Ms. Salma Waite to [address] [place] [address] for evaluation. My notes for this consultation are attached. If you have questions, please do not hesitate to call me. I look forward to following your patient along with you.       Sincerely,    Shirley Siddiqi MD

## 2021-06-02 NOTE — PROGRESS NOTES
Identified pt with two pt identifiers(name and ). Reviewed record in preparation for visit and have obtained necessary documentation. All patient medications has been reviewed. Chief Complaint   Patient presents with    Surgical Follow-up     1 week s/p excisiom of lipoma on        Health Maintenance Due   Topic    Hepatitis C Screening     Pneumococcal 0-64 years (1 of 2 - PPSV23)    Foot Exam Q1     Eye Exam Retinal or Dilated     DTaP/Tdap/Td series (1 - Tdap)    PAP AKA CERVICAL CYTOLOGY     Shingrix Vaccine Age 50> (1 of 2)    Colorectal Cancer Screening Combo     Breast Cancer Screen Mammogram     COVID-19 Vaccine (2 - Inadvertent mRNA 2-dose series)       Vitals:    21 0900   BP: 120/71   Pulse: 87   Temp: 96.8 °F (36 °C)   TempSrc: Oral   SpO2: 96%   Weight: 105.7 kg (233 lb)   Height: 5' 8\" (1.727 m)   PainSc:   0 - No pain       4. Have you been to the ER, urgent care clinic since your last visit? Hospitalized since your last visit? No    5. Have you seen or consulted any other health care providers outside of the [address] [place] [address] since your last visit? Include any pap smears or colon screening. No      Patient is accompanied by self I have received verbal consent from Latisha Crocker to discuss any/all medical information while they are present in the room.

## 2021-06-07 LAB
SARS-COV-2, COV2: NORMAL
SARS-COV-2, COV2NT: NOT DETECTED

## 2021-06-17 ENCOUNTER — HOSPITAL ENCOUNTER (OUTPATIENT)
Dept: NUTRITION | Age: 61
Discharge: HOME | End: 2021-06-17
Payer: MEDICAID

## 2021-06-17 DIAGNOSIS — E66.9 OBESITY, UNSPECIFIED CLASSIFICATION, UNSPECIFIED OBESITY TYPE, UNSPECIFIED WHETHER SERIOUS COMORBIDITY PRESENT: ICD-10-CM

## 2021-06-17 PROCEDURE — 97803 MED NUTRITION INDIV SUBSEQ: CPT | Performed by: DIETITIAN, REGISTERED

## 2021-06-17 NOTE — PROGRESS NOTES
NUTRITION - FOLLOW-UP TREATMENT NOTE  Patient Name: Mavis Redman         Date: 2021  : 1960    YES Patient  Verified  Diagnosis: E66.01 (ICD-10-CM) - Morbid obesity ([facility]   In time:   9:30am        Out time:   10:00am   Total Treatment Time (min): 30     SUBJECTIVE/ASSESSMENT  Current Wt: 227.6 Previous Wt: 234.8 Wt Change: -7.2     Initial Wt: 250 Total Wt change: -22.9 Height: 68     Past Medical History:   Diagnosis Date    Asthma     COVID-19 vaccine administered     Moderna x2 vaccines    Diabetes ([facility]     History of seasonal allergies     Hypertension     [facility]      Changes in medication or medical history? Any new allergies, surgeries or procedures? Yes - labs  If yes, update Summary List   Surgery to remove spots on arm. Nutrition Diagnosis        Diagnosis Status: obesity R/T excessive energy intake AEB BMI of 38 and dietary recall showing mindless snacking at night and during the day. [x]  Improved []  No Change    []  Declined   []  Discontinued      Physical inactivity R/T emotional barriers AEB pt report of fears of COVID reducing her overall activity level and movement  [x]  Improved []  No Change    []  Declined   []  Discontinued      Food and nutrition related knowledge deficit R/T lack of prior education for healthy weight loss AEB request for counseling  [x]  Improved []  No Change    []  Declined   []  Discontinued        Nutrition Monitoring and Evaluation: Increased protein with Premier protein shakes and making sure to try and get in Protein with meals and snacks. Pt has also tried to implement new whole grain options w/ grain blend. Pt asked about oat milk vs. [place] milk. Recommended that if not drinking cow's milk then finding ways to still obtain the nutritional components in the diet.      Food Recall:   B: Protein shake, cantaloupe  L: Tuna w/ light to on flat bread ([employer] brand), cucumber and tomato   D: Chicken, celery and onions, squash   S: Flavored rice cakes ( while watching TV)   Discussed new nutrition needs based on weight loss. Pt reports increased mindfulness around hunger/boredom   Increased water intake. Previous goals:  Met. Continued. -continue tracking 3 days per week. Can use michael instead of paper to help. Met. Continued. -return to walking 3 mornings per week + additional activity on other days such as gardening. Met. Continued. -try 1 new fruit/vetable per week to increase diversity of food choices. Recipes provided through [employer]  Met. Continued. -increase protein intake to 85g per day. Nutrition Prescription and  Intervention CBT   Self monitoring - michael food log. Exercise goals to work towards 150min of activity per week minimum. Reviewed labs with pt. A1C is consistent. Very well controlled Diabetes. She was disappointed with A1C not lowering, but discussed how well controlled blood sugars, weight loss, and exercise are supporting delayed disease progression. Educated for protein needs *0.8g/kg/bw = 85g daily  Estimate Needs:    Equation( [x]?? MSJ ; []??  HBE; []?? Rowell; []?? other)  * Activity Factor (1.2) -250-500   Calories: 9018-3173  Protein: 83 Carbs: 213 Fat: 47   Kcal/day  g/day  g/day  g/day            percent: 25   50   25            Patient Education:  [x]  Review current plan with patient   []  Other:    Handouts/  Information Provided: []  Carbohydrates  []  Protein  []  Fiber  []  Serving Sizes  []  Fluids  []  General guidelines []  Diabetes  []  Cholesterol  []  Sodium  []  SBGM  []  Food Journals  []  Others:      Patient Goals - Waking in the morning (9am) 3x week   - Continue tracking foods and meeting new nutrition needs   - Make sure to eat at least 83g of protein per day.               PLAN  [x]  Continue on current plan [x]  Follow-up PRN   []  Discharge due to :    []  Next appt:      Dietitian: Angie Chowdhury MS, RD, CSSD    Date: 6/17/2021 Time: 10:30 AM

## 2021-07-09 RX ORDER — BUDESONIDE AND FORMOTEROL FUMARATE DIHYDRATE 160; 4.5 UG/1; UG/1
2 AEROSOL RESPIRATORY (INHALATION) 2 TIMES DAILY
Qty: 3 INHALER | Refills: 0 | Status: SHIPPED | OUTPATIENT
Start: 2021-07-09 | End: 2021-10-26

## 2021-08-09 ENCOUNTER — OFFICE VISIT (OUTPATIENT)
Dept: INTERNAL MEDICINE CLINIC | Age: 61
End: 2021-08-09
Payer: MEDICAID

## 2021-08-09 VITALS
RESPIRATION RATE: 18 BRPM | WEIGHT: 231 LBS | HEIGHT: 68 IN | OXYGEN SATURATION: 97 % | SYSTOLIC BLOOD PRESSURE: 115 MMHG | DIASTOLIC BLOOD PRESSURE: 81 MMHG | HEART RATE: 72 BPM | BODY MASS INDEX: 35.01 KG/M2 | TEMPERATURE: 97.7 F

## 2021-08-09 DIAGNOSIS — N39.0 URINARY TRACT INFECTION WITHOUT HEMATURIA, SITE UNSPECIFIED: ICD-10-CM

## 2021-08-09 DIAGNOSIS — I10 ESSENTIAL HYPERTENSION: ICD-10-CM

## 2021-08-09 DIAGNOSIS — E11.29 CONTROLLED TYPE 2 DIABETES MELLITUS WITH OTHER DIABETIC KIDNEY COMPLICATION, WITHOUT LONG-TERM CURRENT USE OF INSULIN (HCC): Primary | ICD-10-CM

## 2021-08-09 DIAGNOSIS — M62.830 MUSCLE SPASM OF BACK: ICD-10-CM

## 2021-08-09 PROCEDURE — 99214 OFFICE O/P EST MOD 30 MIN: CPT | Performed by: INTERNAL MEDICINE

## 2021-08-09 RX ORDER — CEPHALEXIN 500 MG/1
500 CAPSULE ORAL 4 TIMES DAILY
Qty: 40 CAPSULE | Refills: 0 | Status: SHIPPED | OUTPATIENT
Start: 2021-08-09 | End: 2021-08-19

## 2021-08-09 RX ORDER — IBUPROFEN 800 MG/1
800 TABLET ORAL
Qty: 30 TABLET | Refills: 1 | Status: SHIPPED | OUTPATIENT
Start: 2021-08-09

## 2021-08-09 NOTE — PROGRESS NOTES
Subjective:      Karthikeyan Ordoñez is a 59 y.o. female who presents today for   Chief Complaint   Patient presents with    Diabetes    Hypertension   type 2 DM  takes metformin twice daily  Last A1c was 6.2    2/2021  Ophthalmologist sees annually  Podiatrist she is followed by Dr. Brandin Yancey  Pneumovax 2020  Flu  Sept 2020  Covid 19 vaccine  moderna x 2  microalbumin 2/2021  Lipid 2/2021        Hypertension  BP well controlled  Takes avapro and hctz  Compliant and denies side effects     Asthma  Has been stable, no recent flare ups     Vit d deficiency  Takes Vit D supplement     Obesity  Height- 5'81/2     Weight-231 lbs   today  She has lost a total of 19 lbs                 Was referred to Sary Khan (nutritionist) and Dr. Chino Madrigal (weight loss)      covid x 2  Pneumovax q 5  tdap  shingrix X2  Influenza annually  Hep B x 3  All of these vaccines are UTD - patient is checking on tdap    Patient Active Problem List    Diagnosis Date Noted    Lipoma of upper extremity 22/82/5972    Uncomplicated asthma 20/37/4145    Vitamin D deficiency 11/23/2016     Current Outpatient Medications   Medication Sig Dispense Refill    budesonide-formoteroL (Symbicort) 160-4.5 mcg/actuation HFAA Take 2 Puffs by inhalation two (2) times a day. 3 Inhaler 0    ciclopirox (PENLAC) 8 % solution Apply  to affected area nightly.  clotrimazole-betamethasone (LOTRISONE) topical cream APPLY CREAM TOPICALLY TO AFFECTED AREA TWICE DAILY      irbesartan (AVAPRO) 75 mg tablet TAKE 1 TABLET BY MOUTH ONCE DAILY 90 Tab 1    hydroCHLOROthiazide (MICROZIDE) 12.5 mg capsule TAKE 1 CAPSULE BY MOUTH ONCE DAILY 90 Cap 1    metFORMIN (GLUCOPHAGE) 500 mg tablet TAKE 1 TABLET BY MOUTH TWICE DAILY 180 Tab 1    montelukast (Singulair) 10 mg tablet Take 10 mg by mouth daily.  cholecalciferol, vitamin D3, 50 mcg (2,000 unit) tab Take  by mouth daily.       mometasone (Nasonex) 50 mcg/actuation nasal spray 2 Sprays by Both Nostrils route as needed.  multivitamin with iron (DAILY MULTI-VITAMINS/IRON) tablet Take 1 Tab by mouth daily.  ibuprofen (MOTRIN) 800 mg tablet Take 1 Tab by mouth every eight (8) hours as needed for Pain. 30 Tab 1     Allergies   Allergen Reactions    Latex Swelling    Sulfa (Sulfonamide Antibiotics) Swelling     Vaginal swelling     Past Medical History:   Diagnosis Date    Asthma     COVID-19 vaccine administered     Moderna x2 vaccines    [facility]     History of seasonal allergies     Hypertension     Morbid obesity ([facility]      Past Surgical History:   Procedure Laterality Date    HX LIPOMA RESECTION      HX ORTHOPAEDIC Right     bunionectomy    HX TUBAL LIGATION  1983     Family History   Problem Relation Age of Onset    Diabetes Mother     Hypertension Mother     Kidney Disease Mother     No Known Problems Father     Cancer Sister         Breast    Breast Cancer Sister 52    Huntingtons disease Maternal Aunt     Huntingtons disease Maternal Grandfather      Social History     Tobacco Use    Smoking status: Never Smoker    Smokeless tobacco: Never Used   Substance Use Topics    Alcohol use: Not Currently        Review of Systems    A comprehensive review of systems was negative except for that written in the HPI. Objective:     Visit Vitals  /81 (BP 1 Location: Right arm, BP Patient Position: Sitting)   Pulse 72   Temp 97.7 °F (36.5 °C) (Oral)   Resp 18   Ht 5' 8\" (1.727 m)   Wt 231 lb (104.8 kg)   SpO2 97%   BMI 35.12 kg/m²     General:  Alert, cooperative, no distress, appears stated age. Head:  Normocephalic, without obvious abnormality, atraumatic. Eyes:  Conjunctivae/corneas clear. PERRL, EOMs intact. Ears:  Normal TMs and external ear canals both ears. Nose: Nares normal. Septum midline. Mucosa normal. No drainage or sinus tenderness.    Throat: Lips, mucosa, and tongue normal. Teeth and gums normal.   Neck: Supple, symmetrical, trachea midline, no adenopathy, thyroid: no enlargement/tenderness/nodules, no carotid bruit and no JVD. Back:   Symmetric, no curvature. ROM normal. No CVA tenderness. Lungs:   Clear to auscultation bilaterally. Chest wall:  No tenderness or deformity. Heart:  Regular rate and rhythm, S1, S2 normal, no murmur, click, rub or gallop. Abdomen:   Soft, non-tender. Bowel sounds normal. No masses,  No organomegaly. Extremities: Extremities normal, atraumatic, no cyanosis or edema. Pulses: 2+ and symmetric all extremities. Skin: Skin color, texture, turgor normal. No rashes or lesions. Lymph nodes: Cervical, supraclavicular, and axillary nodes normal.   Neurologic: CNII-XII intact. Normal strength, sensation and reflexes throughout. Assessment/Plan:       ICD-10-CM ICD-9-CM    1. Controlled type 2 diabetes mellitus with other diabetic kidney complication, without long-term current use of insulin ([facility])  Z79.53 202.64 METABOLIC PANEL, COMPREHENSIVE      HEMOGLOBIN A1C WITH EAG      HEMOGLOBIN A1C WITH EAG      METABOLIC PANEL, COMPREHENSIVE   2. Muscle spasm of back  M62.830 724.8 ibuprofen (MOTRIN) 800 mg tablet   3. Essential hypertension  I10 401.9 Continue current management   4. Urinary tract infection without hematuria, site unspecified  N39.0 599.0 URINALYSIS W/ RFLX MICROSCOPIC      CULTURE, URINE      CULTURE, URINE      URINALYSIS W/ RFLX MICROSCOPIC          Advised her to call back or return to office if symptoms worsen/change/persist.  Discussed expected course/resolution/complications of diagnosis in detail with patient. Medication risks/benefits/costs/interactions/alternatives discussed with patient. She was given an after visit summary which includes diagnoses, current medications, & vitals.   She expressed understanding with the diagnosis and plan.+++

## 2021-08-09 NOTE — PROGRESS NOTES
Namita Miner is a 59 y.o. female    Chief Complaint   Patient presents with    Diabetes    Hypertension     1. Have you been to the ER, urgent care clinic since your last visit? Hospitalized since your last visit? No    2. Have you seen or consulted any other health care providers outside of the [address] [place] [address] since your last visit? Include any pap smears or colon screening.   No

## 2021-08-10 LAB
ALBUMIN SERPL-MCNC: 3.7 G/DL (ref 3.5–5)
ALBUMIN/GLOB SERPL: 1 {RATIO} (ref 1.1–2.2)
ALP SERPL-CCNC: 89 U/L (ref 45–117)
ALT SERPL-CCNC: 32 U/L (ref 12–78)
ANION GAP SERPL CALC-SCNC: 6 MMOL/L (ref 5–15)
APPEARANCE UR: ABNORMAL
AST SERPL-CCNC: 16 U/L (ref 15–37)
BACTERIA URNS QL MICRO: ABNORMAL /HPF
BILIRUB SERPL-MCNC: 0.3 MG/DL (ref 0.2–1)
BILIRUB UR QL: NEGATIVE
BUN SERPL-MCNC: 13 MG/DL (ref 6–20)
BUN/CREAT SERPL: 17 (ref 12–20)
CALCIUM SERPL-MCNC: 9.4 MG/DL (ref 8.5–10.1)
CHLORIDE SERPL-SCNC: 106 MMOL/L (ref 97–108)
CO2 SERPL-SCNC: 27 MMOL/L (ref 21–32)
COLOR UR: ABNORMAL
CREAT SERPL-MCNC: 0.76 MG/DL (ref 0.55–1.02)
EPITH CASTS URNS QL MICRO: ABNORMAL /LPF
EST. AVERAGE GLUCOSE BLD GHB EST-MCNC: 134 MG/DL
GLOBULIN SER CALC-MCNC: 3.6 G/DL (ref 2–4)
GLUCOSE SERPL-MCNC: 113 MG/DL (ref 65–100)
GLUCOSE UR STRIP.AUTO-MCNC: NEGATIVE MG/DL
HBA1C MFR BLD: 6.3 % (ref 4–5.6)
HGB UR QL STRIP: NEGATIVE
HYALINE CASTS URNS QL MICRO: ABNORMAL /LPF (ref 0–5)
KETONES UR QL STRIP.AUTO: NEGATIVE MG/DL
LEUKOCYTE ESTERASE UR QL STRIP.AUTO: ABNORMAL
NITRITE UR QL STRIP.AUTO: NEGATIVE
PH UR STRIP: 5.5 [PH] (ref 5–8)
POTASSIUM SERPL-SCNC: 3.7 MMOL/L (ref 3.5–5.1)
PROT SERPL-MCNC: 7.3 G/DL (ref 6.4–8.2)
PROT UR STRIP-MCNC: NEGATIVE MG/DL
RBC #/AREA URNS HPF: ABNORMAL /HPF (ref 0–5)
SODIUM SERPL-SCNC: 139 MMOL/L (ref 136–145)
SP GR UR REFRACTOMETRY: 1.02 (ref 1–1.03)
UROBILINOGEN UR QL STRIP.AUTO: 0.2 EU/DL (ref 0.2–1)
WBC URNS QL MICRO: ABNORMAL /HPF (ref 0–4)

## 2021-08-12 LAB
BACTERIA SPEC CULT: ABNORMAL
CC UR VC: ABNORMAL
SERVICE CMNT-IMP: ABNORMAL

## 2021-08-26 RX ORDER — METFORMIN HYDROCHLORIDE 500 MG/1
TABLET ORAL
Qty: 180 TABLET | Refills: 1 | Status: SHIPPED | OUTPATIENT
Start: 2021-08-26 | End: 2022-02-11 | Stop reason: SDUPTHER

## 2021-10-26 RX ORDER — BUDESONIDE AND FORMOTEROL FUMARATE DIHYDRATE 160; 4.5 UG/1; UG/1
AEROSOL RESPIRATORY (INHALATION)
Qty: 33 G | Refills: 0 | Status: SHIPPED | OUTPATIENT
Start: 2021-10-26 | End: 2021-10-27 | Stop reason: SDUPTHER

## 2021-11-02 RX ORDER — BUDESONIDE AND FORMOTEROL FUMARATE DIHYDRATE 160; 4.5 UG/1; UG/1
2 AEROSOL RESPIRATORY (INHALATION) 2 TIMES DAILY
Qty: 33 G | Refills: 0 | Status: SHIPPED | OUTPATIENT
Start: 2021-11-02 | End: 2022-01-13 | Stop reason: SDUPTHER

## 2021-11-10 ENCOUNTER — OFFICE VISIT (OUTPATIENT)
Dept: INTERNAL MEDICINE CLINIC | Age: 61
End: 2021-11-10
Payer: MEDICAID

## 2021-11-10 VITALS
HEART RATE: 72 BPM | HEIGHT: 68 IN | WEIGHT: 241 LBS | RESPIRATION RATE: 18 BRPM | BODY MASS INDEX: 36.53 KG/M2 | TEMPERATURE: 98.2 F | OXYGEN SATURATION: 97 % | DIASTOLIC BLOOD PRESSURE: 76 MMHG | SYSTOLIC BLOOD PRESSURE: 125 MMHG

## 2021-11-10 DIAGNOSIS — I10 ESSENTIAL HYPERTENSION: ICD-10-CM

## 2021-11-10 DIAGNOSIS — E66.01 MORBID OBESITY (HCC): ICD-10-CM

## 2021-11-10 DIAGNOSIS — N39.0 URINARY TRACT INFECTION WITHOUT HEMATURIA, SITE UNSPECIFIED: ICD-10-CM

## 2021-11-10 DIAGNOSIS — E11.29 CONTROLLED TYPE 2 DIABETES MELLITUS WITH OTHER DIABETIC KIDNEY COMPLICATION, WITHOUT LONG-TERM CURRENT USE OF INSULIN (HCC): ICD-10-CM

## 2021-11-10 PROCEDURE — 99214 OFFICE O/P EST MOD 30 MIN: CPT | Performed by: INTERNAL MEDICINE

## 2021-11-10 RX ORDER — IRBESARTAN 75 MG/1
TABLET ORAL
Qty: 90 TABLET | Refills: 1 | Status: SHIPPED | OUTPATIENT
Start: 2021-11-10 | End: 2022-05-11 | Stop reason: SDUPTHER

## 2021-11-10 RX ORDER — MONTELUKAST SODIUM 10 MG/1
10 TABLET ORAL DAILY
Qty: 90 TABLET | Refills: 3 | Status: SHIPPED | OUTPATIENT
Start: 2021-11-10 | End: 2022-01-27 | Stop reason: SDUPTHER

## 2021-11-10 RX ORDER — HYDROCHLOROTHIAZIDE 12.5 MG/1
CAPSULE ORAL
Qty: 90 CAPSULE | Refills: 3 | Status: SHIPPED | OUTPATIENT
Start: 2021-11-10

## 2021-11-10 NOTE — PROGRESS NOTES
Chief Complaint   Patient presents with    Follow-up     3 month     1. Have you been to the ER, urgent care clinic since your last visit? Hospitalized since your last visit? No    2. Have you seen or consulted any other health care providers outside of the [address] [place] [address] since your last visit? Include any pap smears or colon screening.  No

## 2021-11-10 NOTE — PROGRESS NOTES
Subjective:      Radha Tate is a 59 y.o. female who presents today for   Chief Complaint   Patient presents with    Follow-up     3 month      Diabetes    Hypertension   type 2 DM  takes metformin twice daily  Last A1c was  6.3  Ophthalmologist sees annually  Podiatrist she is followed by Dr. Charito Ji  Pneumovax 2020  Flu  3973  Covid 19 vaccine  moderna x 2  covid booster updated  Shingles UTD  TDAP today  microalbumin 2/2021  Lipid 2/2021        Hypertension  BP well controlled  Takes avapro and hctz  Compliant and denies side effects     Asthma  Has been stable, no recent flare ups    UTI has completed course of macrobid in August. No symptoms currently     Vit d deficiency  Takes Vit D supplement     Obesity  Height- 5'81/2     Weight-241 lbs   today  Has gained 10 lbs since last visit  BMI 36.6  Having a lot of challenges with weight loss, weight goes up and down, has tried various plans, having real concerns and feels she needs a more formal plan and accountability                  Patient Active Problem List    Diagnosis Date Noted    Lipoma of upper extremity 94/71/5657    Uncomplicated asthma 45/16/9861    Vitamin D deficiency 11/23/2016     Current Outpatient Medications   Medication Sig Dispense Refill    montelukast (Singulair) 10 mg tablet Take 1 Tablet by mouth daily. 90 Tablet 3    hydroCHLOROthiazide (MICROZIDE) 12.5 mg capsule TAKE 1 CAPSULE BY MOUTH ONCE DAILY 90 Capsule 3    irbesartan (AVAPRO) 75 mg tablet TAKE 1 TABLET BY MOUTH ONCE DAILY 90 Tablet 1    Symbicort 160-4.5 mcg/actuation HFAA Take 2 Puffs by inhalation two (2) times a day. 33 g 0    metFORMIN (GLUCOPHAGE) 500 mg tablet TAKE 1 TABLET BY MOUTH TWICE DAILY 180 Tablet 1    ibuprofen (MOTRIN) 800 mg tablet Take 1 Tablet by mouth every eight (8) hours as needed for Pain. 30 Tablet 1    ciclopirox (PENLAC) 8 % solution Apply  to affected area nightly.       clotrimazole-betamethasone (LOTRISONE) topical cream APPLY CREAM TOPICALLY TO AFFECTED AREA TWICE DAILY      cholecalciferol, vitamin D3, 50 mcg (2,000 unit) tab Take  by mouth daily.  mometasone (Nasonex) 50 mcg/actuation nasal spray 2 Sprays by Both Nostrils route as needed.  multivitamin with iron (DAILY MULTI-VITAMINS/IRON) tablet Take 1 Tab by mouth daily. Allergies   Allergen Reactions    Latex Swelling    Sulfa (Sulfonamide Antibiotics) Swelling     Vaginal swelling     Past Medical History:   Diagnosis Date    Asthma     COVID-19 vaccine administered     Moderna x2 vaccines    Diabetes ([facility]     History of seasonal allergies     Hypertension     Morbid obesity ([facility]      Past Surgical History:   Procedure Laterality Date    HX LIPOMA RESECTION      HX ORTHOPAEDIC Right     bunionectomy    HX TUBAL LIGATION  1983     Family History   Problem Relation Age of Onset    Diabetes Mother     Hypertension Mother     Kidney Disease Mother     No Known Problems Father     Cancer Sister         Breast    Breast Cancer Sister 52    Huntingtons disease Maternal Aunt     Huntingtons disease Maternal Grandfather      Social History     Tobacco Use    Smoking status: Never Smoker    Smokeless tobacco: Never Used   Substance Use Topics    Alcohol use: Not Currently        Review of Systems    A comprehensive review of systems was negative except for that written in the HPI. Objective:     Visit Vitals  /76   Pulse 72   Temp 98.2 °F (36.8 °C) (Oral)   Resp 18   Ht 5' 8\" (1.727 m)   Wt 241 lb (109.3 kg)   SpO2 97%   BMI 36.64 kg/m²     General:  Alert, cooperative, no distress, appears stated age. Head:  Normocephalic, without obvious abnormality, atraumatic. Eyes:  Conjunctivae/corneas clear. PERRL, EOMs intact. Ears:  Normal TMs and external ear canals both ears. Nose: Nares normal. Septum midline. Mucosa normal. No drainage or sinus tenderness.    Throat: Lips, mucosa, and tongue normal. Teeth and gums normal.   Neck: Supple, symmetrical, trachea midline, no adenopathy, thyroid: no enlargement/tenderness/nodules, no carotid bruit and no JVD. Back:   Symmetric, no curvature. ROM normal. No CVA tenderness. Lungs:   Clear to auscultation bilaterally. Chest wall:  No tenderness or deformity. Heart:  Regular rate and rhythm, S1, S2 normal, no murmur, click, rub or gallop. Abdomen:   Soft, non-tender. Bowel sounds normal. No masses,  No organomegaly. Extremities: Extremities normal, atraumatic, no cyanosis or edema. Pulses: 2+ and symmetric all extremities. Skin: Skin color, texture, turgor normal. No rashes or lesions. Lymph nodes: Cervical, supraclavicular, and axillary nodes normal.   Neurologic: CNII-XII intact. Normal strength, sensation and reflexes throughout. Assessment/Plan:       ICD-10-CM ICD-9-CM    1. BMI 36.0-36.9,adult  Z68.36 V85.36 REFERRAL TO WEIGHT LOSS   2. Essential hypertension  I10 401.9 hydroCHLOROthiazide (MICROZIDE) 12.5 mg capsule      irbesartan (AVAPRO) 75 mg tablet   3. Morbid obesity ([facility])  E66.01 278.01 REFERRAL TO WEIGHT LOSS   4. Controlled type 2 diabetes mellitus with other diabetic kidney complication, without long-term current use of insulin ([facility])  E11.29 250.40 LIPID PANEL      HEMOGLOBIN A1C WITH EAG      METABOLIC PANEL, COMPREHENSIVE      METABOLIC PANEL, COMPREHENSIVE      HEMOGLOBIN A1C WITH EAG      LIPID PANEL   5. Urinary tract infection without hematuria, site unspecified  N39.0 599.0 URINALYSIS W/ RFLX MICROSCOPIC      CULTURE, URINE      CULTURE, URINE      URINALYSIS W/ RFLX MICROSCOPIC          Advised her to call back or return to office if symptoms worsen/change/persist.  Discussed expected course/resolution/complications of diagnosis in detail with patient. Medication risks/benefits/costs/interactions/alternatives discussed with patient. She was given an after visit summary which includes diagnoses, current medications, & vitals.   She expressed understanding with the diagnosis and plan.

## 2021-11-11 LAB
ALBUMIN SERPL-MCNC: 3.9 G/DL (ref 3.5–5)
ALBUMIN/GLOB SERPL: 1 {RATIO} (ref 1.1–2.2)
ALP SERPL-CCNC: 79 U/L (ref 45–117)
ALT SERPL-CCNC: 44 U/L (ref 12–78)
ANION GAP SERPL CALC-SCNC: 4 MMOL/L (ref 5–15)
APPEARANCE UR: CLEAR
AST SERPL-CCNC: 29 U/L (ref 15–37)
BACTERIA URNS QL MICRO: NEGATIVE /HPF
BILIRUB SERPL-MCNC: 0.3 MG/DL (ref 0.2–1)
BILIRUB UR QL: NEGATIVE
BUN SERPL-MCNC: 18 MG/DL (ref 6–20)
BUN/CREAT SERPL: 20 (ref 12–20)
CALCIUM SERPL-MCNC: 9.7 MG/DL (ref 8.5–10.1)
CAOX CRY URNS QL MICRO: ABNORMAL
CHLORIDE SERPL-SCNC: 105 MMOL/L (ref 97–108)
CHOLEST SERPL-MCNC: 180 MG/DL
CO2 SERPL-SCNC: 29 MMOL/L (ref 21–32)
COLOR UR: ABNORMAL
COMMENT, HOLDF: NORMAL
CREAT SERPL-MCNC: 0.89 MG/DL (ref 0.55–1.02)
EPITH CASTS URNS QL MICRO: ABNORMAL /LPF
EST. AVERAGE GLUCOSE BLD GHB EST-MCNC: 137 MG/DL
GLOBULIN SER CALC-MCNC: 3.9 G/DL (ref 2–4)
GLUCOSE SERPL-MCNC: 123 MG/DL (ref 65–100)
GLUCOSE UR STRIP.AUTO-MCNC: NEGATIVE MG/DL
HBA1C MFR BLD: 6.4 % (ref 4–5.6)
HDLC SERPL-MCNC: 87 MG/DL
HDLC SERPL: 2.1 {RATIO} (ref 0–5)
HGB UR QL STRIP: NEGATIVE
KETONES UR QL STRIP.AUTO: NEGATIVE MG/DL
LDLC SERPL CALC-MCNC: 73.8 MG/DL (ref 0–100)
LEUKOCYTE ESTERASE UR QL STRIP.AUTO: NEGATIVE
MUCOUS THREADS URNS QL MICRO: ABNORMAL /LPF
NITRITE UR QL STRIP.AUTO: NEGATIVE
PH UR STRIP: 6.5 [PH] (ref 5–8)
POTASSIUM SERPL-SCNC: 4.4 MMOL/L (ref 3.5–5.1)
PROT SERPL-MCNC: 7.8 G/DL (ref 6.4–8.2)
PROT UR STRIP-MCNC: NEGATIVE MG/DL
RBC #/AREA URNS HPF: ABNORMAL /HPF (ref 0–5)
SAMPLES BEING HELD,HOLD: NORMAL
SODIUM SERPL-SCNC: 138 MMOL/L (ref 136–145)
SP GR UR REFRACTOMETRY: 1.02 (ref 1–1.03)
TRIGL SERPL-MCNC: 96 MG/DL (ref ?–150)
UROBILINOGEN UR QL STRIP.AUTO: 0.2 EU/DL (ref 0.2–1)
VLDLC SERPL CALC-MCNC: 19.2 MG/DL
WBC URNS QL MICRO: ABNORMAL /HPF (ref 0–4)

## 2021-11-12 LAB
BACTERIA SPEC CULT: NORMAL
CC UR VC: NORMAL
SERVICE CMNT-IMP: NORMAL

## 2021-12-01 DIAGNOSIS — Z76.89 ENCOUNTER FOR WEIGHT MANAGEMENT: Primary | ICD-10-CM

## 2021-12-01 DIAGNOSIS — E66.01 MORBID OBESITY (HCC): ICD-10-CM

## 2022-01-17 RX ORDER — BUDESONIDE AND FORMOTEROL FUMARATE DIHYDRATE 160; 4.5 UG/1; UG/1
2 AEROSOL RESPIRATORY (INHALATION) 2 TIMES DAILY
Qty: 33 G | Refills: 0 | Status: SHIPPED | OUTPATIENT
Start: 2022-01-17 | End: 2022-07-28

## 2022-01-27 RX ORDER — MONTELUKAST SODIUM 10 MG/1
10 TABLET ORAL DAILY
Qty: 90 TABLET | Refills: 3 | Status: SHIPPED | OUTPATIENT
Start: 2022-01-27

## 2022-02-11 ENCOUNTER — OFFICE VISIT (OUTPATIENT)
Dept: INTERNAL MEDICINE CLINIC | Age: 62
End: 2022-02-11
Payer: MEDICAID

## 2022-02-11 VITALS
HEIGHT: 68 IN | DIASTOLIC BLOOD PRESSURE: 69 MMHG | RESPIRATION RATE: 18 BRPM | WEIGHT: 238.1 LBS | TEMPERATURE: 97.9 F | HEART RATE: 67 BPM | BODY MASS INDEX: 36.09 KG/M2 | OXYGEN SATURATION: 97 % | SYSTOLIC BLOOD PRESSURE: 115 MMHG

## 2022-02-11 DIAGNOSIS — E11.29 CONTROLLED TYPE 2 DIABETES MELLITUS WITH OTHER DIABETIC KIDNEY COMPLICATION, WITHOUT LONG-TERM CURRENT USE OF INSULIN (HCC): Primary | ICD-10-CM

## 2022-02-11 DIAGNOSIS — E66.01 MORBID OBESITY (HCC): ICD-10-CM

## 2022-02-11 DIAGNOSIS — M25.50 ARTHRALGIA, UNSPECIFIED JOINT: ICD-10-CM

## 2022-02-11 DIAGNOSIS — E78.49 OTHER HYPERLIPIDEMIA: ICD-10-CM

## 2022-02-11 DIAGNOSIS — I10 HYPERTENSION, UNSPECIFIED TYPE: ICD-10-CM

## 2022-02-11 LAB
ALBUMIN SERPL-MCNC: 3.6 G/DL (ref 3.5–5)
ALBUMIN/GLOB SERPL: 1 {RATIO} (ref 1.1–2.2)
ALP SERPL-CCNC: 80 U/L (ref 45–117)
ALT SERPL-CCNC: 24 U/L (ref 12–78)
ANION GAP SERPL CALC-SCNC: 5 MMOL/L (ref 5–15)
AST SERPL-CCNC: 14 U/L (ref 15–37)
BILIRUB SERPL-MCNC: 0.2 MG/DL (ref 0.2–1)
BUN SERPL-MCNC: 13 MG/DL (ref 6–20)
BUN/CREAT SERPL: 15 (ref 12–20)
CALCIUM SERPL-MCNC: 9.9 MG/DL (ref 8.5–10.1)
CHLORIDE SERPL-SCNC: 107 MMOL/L (ref 97–108)
CHOLEST SERPL-MCNC: 146 MG/DL
CO2 SERPL-SCNC: 27 MMOL/L (ref 21–32)
COMMENT, HOLDF: NORMAL
CREAT SERPL-MCNC: 0.86 MG/DL (ref 0.55–1.02)
CREAT UR-MCNC: 155 MG/DL
ERYTHROCYTE [SEDIMENTATION RATE] IN BLOOD: 47 MM/HR (ref 0–30)
EST. AVERAGE GLUCOSE BLD GHB EST-MCNC: 137 MG/DL
GLOBULIN SER CALC-MCNC: 3.6 G/DL (ref 2–4)
GLUCOSE SERPL-MCNC: 110 MG/DL (ref 65–100)
HBA1C MFR BLD: 6.4 % (ref 4–5.6)
HDLC SERPL-MCNC: 68 MG/DL
HDLC SERPL: 2.1 {RATIO} (ref 0–5)
LDLC SERPL CALC-MCNC: 60.2 MG/DL (ref 0–100)
MAGNESIUM SERPL-MCNC: 2.2 MG/DL (ref 1.6–2.4)
MICROALBUMIN UR-MCNC: 1.91 MG/DL
MICROALBUMIN/CREAT UR-RTO: 12 MG/G (ref 0–30)
POTASSIUM SERPL-SCNC: 3.9 MMOL/L (ref 3.5–5.1)
PROT SERPL-MCNC: 7.2 G/DL (ref 6.4–8.2)
SAMPLES BEING HELD,HOLD: NORMAL
SODIUM SERPL-SCNC: 139 MMOL/L (ref 136–145)
TRIGL SERPL-MCNC: 89 MG/DL (ref ?–150)
URATE SERPL-MCNC: 5.3 MG/DL (ref 2.6–6)
VLDLC SERPL CALC-MCNC: 17.8 MG/DL

## 2022-02-11 PROCEDURE — 99214 OFFICE O/P EST MOD 30 MIN: CPT | Performed by: INTERNAL MEDICINE

## 2022-02-11 RX ORDER — METFORMIN HYDROCHLORIDE 500 MG/1
TABLET ORAL
Qty: 180 TABLET | Refills: 1 | Status: SHIPPED | OUTPATIENT
Start: 2022-02-11

## 2022-02-11 NOTE — PROGRESS NOTES
Scott Vera is a 59 y.o. female    Chief Complaint   Patient presents with    Hypertension    Diabetes     1. Have you been to the ER, urgent care clinic since your last visit? Hospitalized since your last visit? No    2. Have you seen or consulted any other health care providers outside of the [address] [place] [address] since your last visit? Include any pap smears or colon screening.   No

## 2022-02-11 NOTE — PROGRESS NOTES
Subjective:      Hernandez Reynoso is a 59 y.o. female who presents today for   Chief Complaint   Patient presents with    Hypertension    Diabetes      Diabetes    Hypertension   type 2 DM  takes metformin twice daily  Last A1c was  6.4  Ophthalmologist sees annually  Podiatrist she is followed by Dr. Shahnaz Solorzano  Pneumovax 2020  Flu  1658  Covid 19 vaccine  moderna x 2  covid booster updated  Shingles UTD  TDAP   microalbumin  Normal 2/8/21  Lipid  LDL 73.8        Hypertension  BP well controlled  Takes avapro and hctz  Compliant and denies side effects     Asthma  Has been stable, no recent flare ups       Vit d deficiency  Takes Vit D supplement     Obesity  Height- 5'81/2     Weight-238 lbs   today  BMI 36.2  Had a lot of challenges with weight loss, weight goes up and down, has tried various plans,   She has joined [employer] and has been successful with some weight loss  Goal 1-2 lbs week weight loss         having right hand pain, says hand has looked more swollen. No numbness, tingling or weakness. Patient Active Problem List    Diagnosis Date Noted    Lipoma of upper extremity 04/53/4851    Uncomplicated asthma 31/65/4275    Vitamin D deficiency 11/23/2016     Current Outpatient Medications   Medication Sig Dispense Refill    montelukast (Singulair) 10 mg tablet Take 1 Tablet by mouth daily. 90 Tablet 3    Symbicort 160-4.5 mcg/actuation HFAA Take 2 Puffs by inhalation two (2) times a day. 33 g 0    hydroCHLOROthiazide (MICROZIDE) 12.5 mg capsule TAKE 1 CAPSULE BY MOUTH ONCE DAILY 90 Capsule 3    irbesartan (AVAPRO) 75 mg tablet TAKE 1 TABLET BY MOUTH ONCE DAILY 90 Tablet 1    metFORMIN (GLUCOPHAGE) 500 mg tablet TAKE 1 TABLET BY MOUTH TWICE DAILY 180 Tablet 1    ibuprofen (MOTRIN) 800 mg tablet Take 1 Tablet by mouth every eight (8) hours as needed for Pain. 30 Tablet 1    ciclopirox (PENLAC) 8 % solution Apply  to affected area nightly.       clotrimazole-betamethasone (LOTRISONE) topical cream APPLY CREAM TOPICALLY TO AFFECTED AREA TWICE DAILY      cholecalciferol, vitamin D3, 50 mcg (2,000 unit) tab Take  by mouth daily.  mometasone (Nasonex) 50 mcg/actuation nasal spray 2 Sprays by Both Nostrils route as needed.  multivitamin with iron (DAILY MULTI-VITAMINS/IRON) tablet Take 1 Tab by mouth daily. Allergies   Allergen Reactions    Latex Swelling    Sulfa (Sulfonamide Antibiotics) Swelling     Vaginal swelling     Past Medical History:   Diagnosis Date    Asthma     COVID-19 vaccine administered     Moderna x2 vaccines    [facility]     History of seasonal allergies     Hypertension     Morbid obesity ([facility]      Past Surgical History:   Procedure Laterality Date    HX LIPOMA RESECTION      HX ORTHOPAEDIC Right     bunionectomy    HX TUBAL LIGATION  1983     Family History   Problem Relation Age of Onset    Diabetes Mother     Hypertension Mother     Kidney Disease Mother     No Known Problems Father     Cancer Sister         Breast    Breast Cancer Sister 52    Huntingtons disease Maternal Aunt     Huntingtons disease Maternal Grandfather      Social History     Tobacco Use    Smoking status: Never Smoker    Smokeless tobacco: Never Used   Substance Use Topics    Alcohol use: Not Currently        Review of Systems    A comprehensive review of systems was negative except for that written in the HPI. Objective:     Visit Vitals  /69 (BP 1 Location: Left upper arm, BP Patient Position: Sitting)   Pulse 67   Temp 97.9 °F (36.6 °C) (Oral)   Resp 18   Ht 5' 8\" (1.727 m)   Wt 238 lb 1.6 oz (108 kg)   SpO2 97%   BMI 36.20 kg/m²     General:  Alert, cooperative, no distress, appears stated age. Head:  Normocephalic, without obvious abnormality, atraumatic. Eyes:  Conjunctivae/corneas clear. PERRL, EOMs intact. Ears:  Normal TMs and external ear canals both ears. Nose: Nares normal. Septum midline.  Mucosa normal. No drainage or sinus tenderness. Throat: Lips, mucosa, and tongue normal. Teeth and gums normal.   Neck: Supple, symmetrical, trachea midline, no adenopathy, thyroid: no enlargement/tenderness/nodules, no carotid bruit and no JVD. Back:   Symmetric, no curvature. ROM normal. No CVA tenderness. Lungs:   Clear to auscultation bilaterally. Chest wall:  No tenderness or deformity. Heart:  Regular rate and rhythm, S1, S2 normal, no murmur, click, rub or gallop. Abdomen:   Soft, non-tender. Bowel sounds normal. No masses,  No organomegaly. Extremities: Extremities right hand appears slightly swollen   Pulses: 2+ and symmetric all extremities. Skin: Skin color, texture, turgor normal. No rashes or lesions. Lymph nodes: Cervical, supraclavicular, and axillary nodes normal.   Neurologic: CNII-XII intact. Normal strength, sensation and reflexes throughout. Assessment/Plan:       ICD-10-CM ICD-9-CM    1. Controlled type 2 diabetes mellitus with other diabetic kidney complication, without long-term current use of insulin ([facility])  Q78.17 390.98 METABOLIC PANEL, COMPREHENSIVE      HEMOGLOBIN A1C WITH EAG      MICROALBUMIN, UR, RAND W/ MICROALB/CREAT RATIO      MICROALBUMIN, UR, RAND W/ MICROALB/CREAT RATIO      HEMOGLOBIN A1C WITH EAG      METABOLIC PANEL, COMPREHENSIVE   2. Morbid obesity ([facility]  E66.01 278.01 continue work with weight watchers   3. Hypertension, unspecified type  I10 401.9 Continue current management   4. Other hyperlipidemia  E78.49 272.4 LIPID PANEL      LIPID PANEL   5. Arthralgia, unspecified joint  M25.50 719.40 SED RATE (ESR)      RHEUMATOID FACTOR, QL      SANJANA, DIRECT, W/REFLEX      URIC ACID      MAGNESIUM      MAGNESIUM      URIC ACID      SANJANA, DIRECT, W/REFLEX      RHEUMATOID FACTOR, QL      SED RATE (ESR)          Advised her to call back or return to office if symptoms worsen/change/persist.  Discussed expected course/resolution/complications of diagnosis in detail with patient.     Medication risks/benefits/costs/interactions/alternatives discussed with patient. She was given an after visit summary which includes diagnoses, current medications, & vitals. She expressed understanding with the diagnosis and plan.

## 2022-02-12 LAB
ANA SER QL: NEGATIVE
RHEUMATOID FACT SERPL-ACNC: <10 IU/ML (ref 0–15)

## 2022-03-19 PROBLEM — D17.20 LIPOMA OF UPPER EXTREMITY: Status: ACTIVE | Noted: 2021-05-13

## 2022-05-11 ENCOUNTER — VIRTUAL VISIT (OUTPATIENT)
Dept: INTERNAL MEDICINE CLINIC | Age: 62
End: 2022-05-11
Payer: MEDICAID

## 2022-05-11 DIAGNOSIS — E11.29 CONTROLLED TYPE 2 DIABETES MELLITUS WITH OTHER DIABETIC KIDNEY COMPLICATION, WITHOUT LONG-TERM CURRENT USE OF INSULIN (HCC): ICD-10-CM

## 2022-05-11 DIAGNOSIS — E78.49 OTHER HYPERLIPIDEMIA: ICD-10-CM

## 2022-05-11 DIAGNOSIS — I10 ESSENTIAL HYPERTENSION: ICD-10-CM

## 2022-05-11 DIAGNOSIS — F41.9 ANXIETY: Primary | ICD-10-CM

## 2022-05-11 PROCEDURE — 3044F HG A1C LEVEL LT 7.0%: CPT | Performed by: INTERNAL MEDICINE

## 2022-05-11 PROCEDURE — 99214 OFFICE O/P EST MOD 30 MIN: CPT | Performed by: INTERNAL MEDICINE

## 2022-05-11 RX ORDER — LORAZEPAM 0.5 MG/1
0.5 TABLET ORAL
Qty: 10 TABLET | Refills: 0 | Status: SHIPPED | OUTPATIENT
Start: 2022-05-11

## 2022-05-11 RX ORDER — IRBESARTAN 75 MG/1
TABLET ORAL
Qty: 90 TABLET | Refills: 1 | Status: SHIPPED | OUTPATIENT
Start: 2022-05-11

## 2022-05-11 NOTE — PROGRESS NOTES
Ken Mi is a 57 y.o. female    Chief Complaint   Patient presents with    Diabetes    Hypertension     1. Have you been to the ER, urgent care clinic since your last visit? Hospitalized since your last visit? No      2. Have you seen or consulted any other health care providers outside of the [address] [place] [address] since your last visit? Include any pap smears or colon screening.   No

## 2022-05-11 NOTE — PROGRESS NOTES
Cliff Crespo is a 57 y.o. female who was seen by synchronous (real-time) audio-video technology on 5/11/2022 for Diabetes and Hypertension        Assessment & Plan:   Diagnoses and all orders for this visit:    1. Anxiety  -     LORazepam (ATIVAN) 0.5 mg tablet; Take 1 Tablet by mouth every eight (8) hours as needed for Anxiety. Max Daily Amount: 1.5 mg.    2. Essential hypertension  -     irbesartan (AVAPRO) 75 mg tablet; TAKE 1 TABLET BY MOUTH ONCE DAILY    3. Controlled type 2 diabetes mellitus with other diabetic kidney complication, without long-term current use of insulin (HCC)  -     HEMOGLOBIN A1C WITH EAG; Future    4. Other hyperlipidemia  -     METABOLIC PANEL, COMPREHENSIVE; Future  -     LIPID PANEL; Future            Subjective:      Diabetes    Hypertension   type 2 DM  takes metformin twice daily  Last A1c was  6.4  Ophthalmologist sees annually  Podiatrist she is followed by Dr. Pete Suazo  Pneumovax 2020  Flu  9930  Covid 19 vaccine  moderna x 2  covid booster updated  Shingles UTD  TDAP   microalbumin  Normal 2/8/21  Lipid  LDL 73.8        Hypertension  BP well controlled  Takes avapro and hctz  Compliant and denies side effects     Asthma  Has been stable, no recent flare ups        Vit d deficiency  Takes Vit D supplement     Obesity  Height- 5'81/2     Weight-218 lbs   today  Had a lot of challenges with weight loss, weight goes up and down, has tried various plans,   She has joined [employer] and has been successful with some weight loss  Goal 1-2 lbs week weight loss           Prior to Admission medications    Medication Sig Start Date End Date Taking? Authorizing Provider   metFORMIN (GLUCOPHAGE) 500 mg tablet TAKE 1 TABLET BY MOUTH TWICE DAILY 2/11/22  Yes Alba Bell MD   montelukast (Singulair) 10 mg tablet Take 1 Tablet by mouth daily. 1/27/22  Yes July Cuellar MD   Symbicort 160-4.5 mcg/actuation HFAA Take 2 Puffs by inhalation two (2) times a day. 1/17/22  Yes Roseann Armando MD   hydroCHLOROthiazide (MICROZIDE) 12.5 mg capsule TAKE 1 CAPSULE BY MOUTH ONCE DAILY 11/10/21  Yes Roseann Armando MD   irbesartan (AVAPRO) 75 mg tablet TAKE 1 TABLET BY MOUTH ONCE DAILY 11/10/21  Yes Roseann Armando MD   ibuprofen (MOTRIN) 800 mg tablet Take 1 Tablet by mouth every eight (8) hours as needed for Pain. 8/9/21  Yes Dat Bell MD   ciclopirox (PENLAC) 8 % solution Apply  to affected area nightly. 4/20/21  Yes Provider, Historical   clotrimazole-betamethasone (LOTRISONE) topical cream APPLY CREAM TOPICALLY TO AFFECTED AREA TWICE DAILY 5/3/21  Yes Provider, Historical   cholecalciferol, vitamin D3, 50 mcg (2,000 unit) tab Take  by mouth daily. Yes Provider, Historical   mometasone (Nasonex) 50 mcg/actuation nasal spray 2 Sprays by Both Nostrils route as needed. Yes Provider, Historical   multivitamin with iron (DAILY MULTI-VITAMINS/IRON) tablet Take 1 Tab by mouth daily. Yes Provider, Historical         Review of Systems   Constitutional: Negative for weight loss. Eyes: Negative for blurred vision. Respiratory: Negative for shortness of breath. Cardiovascular: Negative for chest pain and leg swelling. Genitourinary: Negative for frequency and urgency. Musculoskeletal: Negative for joint pain. Neurological: Negative for headaches. Objective:     Patient-Reported Vitals 11/14/2021   Patient-Reported Weight 241.    Patient-Reported Height 5\"8   Patient-Reported Pulse 72   Patient-Reported Temperature 98.2   Patient-Reported SpO2 97%   Patient-Reported Systolic  293   Patient-Reported Diastolic 76   Patient-Reported LMP n/a        [INSTRUCTIONS:  \"[x]\" Indicates a positive item  \"[]\" Indicates a negative item  -- DELETE ALL ITEMS NOT EXAMINED]    Constitutional: [x] Appears well-developed and well-nourished [x] No apparent distress      [] Abnormal -     Mental status: [x] Alert and awake  [x] Oriented to person/place/time [x] Able to follow commands    [] Abnormal -     Eyes:   EOM    [x]  Normal    [] Abnormal -   Sclera  [x]  Normal    [] Abnormal -          Discharge [x]  None visible   [] Abnormal -     HENT: [x] Normocephalic, atraumatic  [] Abnormal -   [x] Mouth/Throat: Mucous membranes are moist    External Ears [x] Normal  [] Abnormal -    Neck: [x] No visualized mass [] Abnormal -     Pulmonary/Chest: [x] Respiratory effort normal   [x] No visualized signs of difficulty breathing or respiratory distress        [] Abnormal -      Musculoskeletal:   [x] Normal gait with no signs of ataxia         [x] Normal range of motion of neck        [] Abnormal -     Neurological:        [x] No Facial Asymmetry (Cranial nerve 7 motor function) (limited exam due to video visit)          [x] No gaze palsy        [] Abnormal -          Skin:        [x] No significant exanthematous lesions or discoloration noted on facial skin         [] Abnormal -            Psychiatric:       [x] Normal Affect [] Abnormal -        [x] No Hallucinations    Other pertinent observable physical exam findings:-        We discussed the expected course, resolution and complications of the diagnosis(es) in detail. Medication risks, benefits, costs, interactions, and alternatives were discussed as indicated. I advised her to contact the office if her condition worsens, changes or fails to improve as anticipated. She expressed understanding with the diagnosis(es) and plan. Daphnealvaro Dougherty, was evaluated through a synchronous (real-time) audio-video encounter. The patient (or guardian if applicable) is aware that this is a billable service, which includes applicable co-pays. Verbal consent to proceed has been obtained. The visit was conducted pursuant to the emergency declaration under the [facility], [address] [place] [address] authority and the [employer] Act. Patient identification was verified, and a caregiver was present when appropriate. The patient was located at home in a state where the provider was licensed to provide care.       Ricky Opitz, MD

## 2022-05-18 LAB
ALBUMIN SERPL-MCNC: 3.6 G/DL (ref 3.5–5)
ALBUMIN/GLOB SERPL: 1 {RATIO} (ref 1.1–2.2)
ALP SERPL-CCNC: 92 U/L (ref 45–117)
ALT SERPL-CCNC: 26 U/L (ref 12–78)
ANION GAP SERPL CALC-SCNC: 2 MMOL/L (ref 5–15)
AST SERPL-CCNC: 23 U/L (ref 15–37)
BILIRUB SERPL-MCNC: 0.2 MG/DL (ref 0.2–1)
BUN SERPL-MCNC: 12 MG/DL (ref 6–20)
BUN/CREAT SERPL: 13 (ref 12–20)
CALCIUM SERPL-MCNC: 9.9 MG/DL (ref 8.5–10.1)
CHLORIDE SERPL-SCNC: 107 MMOL/L (ref 97–108)
CHOLEST SERPL-MCNC: 147 MG/DL
CO2 SERPL-SCNC: 30 MMOL/L (ref 21–32)
CREAT SERPL-MCNC: 0.91 MG/DL (ref 0.55–1.02)
EST. AVERAGE GLUCOSE BLD GHB EST-MCNC: 123 MG/DL
GLOBULIN SER CALC-MCNC: 3.6 G/DL (ref 2–4)
GLUCOSE SERPL-MCNC: 90 MG/DL (ref 65–100)
HBA1C MFR BLD: 5.9 % (ref 4–5.6)
HDLC SERPL-MCNC: 68 MG/DL
HDLC SERPL: 2.2 {RATIO} (ref 0–5)
LDLC SERPL CALC-MCNC: 61.6 MG/DL (ref 0–100)
POTASSIUM SERPL-SCNC: 4 MMOL/L (ref 3.5–5.1)
PROT SERPL-MCNC: 7.2 G/DL (ref 6.4–8.2)
SODIUM SERPL-SCNC: 139 MMOL/L (ref 136–145)
TRIGL SERPL-MCNC: 87 MG/DL (ref ?–150)
VLDLC SERPL CALC-MCNC: 17.4 MG/DL

## 2022-07-01 ENCOUNTER — CLINICAL SUPPORT (OUTPATIENT)
Dept: INTERNAL MEDICINE CLINIC | Age: 62
End: 2022-07-01

## 2022-07-01 VITALS
WEIGHT: 214 LBS | SYSTOLIC BLOOD PRESSURE: 118 MMHG | BODY MASS INDEX: 32.54 KG/M2 | DIASTOLIC BLOOD PRESSURE: 77 MMHG | RESPIRATION RATE: 18 BRPM | HEART RATE: 63 BPM | OXYGEN SATURATION: 97 %

## 2022-07-01 DIAGNOSIS — I10 ESSENTIAL HYPERTENSION: Primary | ICD-10-CM

## 2022-07-01 NOTE — PROGRESS NOTES
Elie Maya is a 57 y.o. female    Chief Complaint   Patient presents with    Blood Pressure Check     Blood pressure 118/77, pulse 63, resp. rate 18, weight 214 lb (97.1 kg), SpO2 97 %.

## 2022-07-28 RX ORDER — BUDESONIDE AND FORMOTEROL FUMARATE DIHYDRATE 160; 4.5 UG/1; UG/1
AEROSOL RESPIRATORY (INHALATION)
Qty: 33 G | Refills: 0 | Status: SHIPPED | OUTPATIENT
Start: 2022-07-28

## 2022-09-17 ENCOUNTER — HOSPITAL ENCOUNTER (EMERGENCY)
Age: 62
Discharge: HOME | End: 2022-09-17
Attending: STUDENT IN AN ORGANIZED HEALTH CARE EDUCATION/TRAINING PROGRAM
Payer: MEDICAID

## 2022-09-17 ENCOUNTER — APPOINTMENT (OUTPATIENT)
Dept: GENERAL RADIOLOGY | Age: 62
End: 2022-09-17
Attending: PHYSICIAN ASSISTANT
Payer: MEDICAID

## 2022-09-17 VITALS
OXYGEN SATURATION: 98 % | WEIGHT: 206.35 LBS | BODY MASS INDEX: 31.27 KG/M2 | HEART RATE: 68 BPM | TEMPERATURE: 97.8 F | HEIGHT: 68 IN | SYSTOLIC BLOOD PRESSURE: 148 MMHG | RESPIRATION RATE: 18 BRPM | DIASTOLIC BLOOD PRESSURE: 72 MMHG

## 2022-09-17 DIAGNOSIS — M25.562 ACUTE PAIN OF LEFT KNEE: Primary | ICD-10-CM

## 2022-09-17 DIAGNOSIS — M17.12 ARTHRITIS OF KNEE, LEFT: ICD-10-CM

## 2022-09-17 PROCEDURE — 99283 EMERGENCY DEPT VISIT LOW MDM: CPT

## 2022-09-17 PROCEDURE — 74011250637 HC RX REV CODE- 250/637: Performed by: PHYSICIAN ASSISTANT

## 2022-09-17 PROCEDURE — 73562 X-RAY EXAM OF KNEE 3: CPT

## 2022-09-17 RX ORDER — NAPROXEN 250 MG/1
250 TABLET ORAL ONCE
Status: COMPLETED | OUTPATIENT
Start: 2022-09-17 | End: 2022-09-17

## 2022-09-17 RX ORDER — NAPROXEN 250 MG/1
250 TABLET ORAL 2 TIMES DAILY WITH MEALS
Qty: 14 TABLET | Refills: 0 | Status: SHIPPED | OUTPATIENT
Start: 2022-09-17 | End: 2022-09-24

## 2022-09-17 RX ADMIN — NAPROXEN 250 MG: 250 TABLET ORAL at 17:04

## 2022-09-17 NOTE — ED PROVIDER NOTES
EMERGENCY DEPARTMENT HISTORY AND PHYSICAL EXAM      Date: 9/17/2022  Patient Name: Lucila Jane    History of Presenting Illness     Chief Complaint   Patient presents with    Knee Pain     Reports worsening left knee pain x 3 days. Denied any falls or injuries. Denied hx of blood clots or SOB. History Provided By: Patient    HPI: Lucila Jane, 57 y.o. female with PMHx of HTN, asthma, presents BIB self to the ED with cc of atraumatic medial left knee pain x5 days. The patient reports getting up from a seated position and feeling a twinge of discomfort at the medial aspect of the knee. Ever since then she has had a dull constant \"soreness\" in the area, made worse with active range of motion. The pain does not radiate. Sometimes when she stands up she feels like the knee is unstable. She denies fall, recent or prior injury. Denies DROM, redness, swelling, calf pain. She is ambulatory in the ED. She has had no relief with Tylenol arthritis. There are no other complaints, changes, or physical findings at this time. PCP: Ricardo Benavides MD    No current facility-administered medications on file prior to encounter. Current Outpatient Medications on File Prior to Encounter   Medication Sig Dispense Refill    Symbicort 160-4.5 mcg/actuation HFAA Inhale 2 puffs by mouth twice daily 33 g 0    irbesartan (AVAPRO) 75 mg tablet TAKE 1 TABLET BY MOUTH ONCE DAILY 90 Tablet 1    LORazepam (ATIVAN) 0.5 mg tablet Take 1 Tablet by mouth every eight (8) hours as needed for Anxiety. Max Daily Amount: 1.5 mg. 10 Tablet 0    metFORMIN (GLUCOPHAGE) 500 mg tablet TAKE 1 TABLET BY MOUTH TWICE DAILY 180 Tablet 1    montelukast (Singulair) 10 mg tablet Take 1 Tablet by mouth daily. 90 Tablet 3    hydroCHLOROthiazide (MICROZIDE) 12.5 mg capsule TAKE 1 CAPSULE BY MOUTH ONCE DAILY 90 Capsule 3    ibuprofen (MOTRIN) 800 mg tablet Take 1 Tablet by mouth every eight (8) hours as needed for Pain.  30 Tablet 1 ciclopirox (PENLAC) 8 % solution Apply  to affected area nightly. clotrimazole-betamethasone (LOTRISONE) topical cream APPLY CREAM TOPICALLY TO AFFECTED AREA TWICE DAILY      cholecalciferol, vitamin D3, 50 mcg (2,000 unit) tab Take  by mouth daily. mometasone (Nasonex) 50 mcg/actuation nasal spray 2 Sprays by Both Nostrils route as needed. multivitamin with iron (DAILY MULTI-VITAMINS/IRON) tablet Take 1 Tab by mouth daily. Past History     Past Medical History:  Past Medical History:   Diagnosis Date    Asthma     COVID-19 vaccine administered     Moderna x2 vaccines    Diabetes ([facility] [address]     History of seasonal allergies     Hypertension     Morbid obesity ([facility] [address]        Past Surgical History:  Past Surgical History:   Procedure Laterality Date    HX LIPOMA RESECTION      HX ORTHOPAEDIC Right     bunionectomy    HX TUBAL LIGATION  1983       Family History:  Family History   Problem Relation Age of Onset    Diabetes Mother     Hypertension Mother     Kidney Disease Mother     No Known Problems Father     Cancer Sister         Breast    Breast Cancer Sister 52    Huntingtons disease Maternal Aunt     Huntingtons disease Maternal Grandfather        Social History:  Social History     Tobacco Use    Smoking status: Never    Smokeless tobacco: Never   Vaping Use    Vaping Use: Never used   Substance Use Topics    Alcohol use: Not Currently    Drug use: Never       Allergies: Allergies   Allergen Reactions    Latex Swelling    Sulfa (Sulfonamide Antibiotics) Swelling     Vaginal swelling         Review of Systems   Review of Systems   Constitutional:  Negative for fever. Musculoskeletal:  Positive for arthralgias. Skin:  Negative for rash. Neurological:  Negative for weakness and numbness. Physical Exam   Physical Exam  Vitals and nursing note reviewed. Constitutional:       General: She is not in acute distress. Appearance: Normal appearance. She is well-developed.    HENT:      Head: Normocephalic and atraumatic. Eyes:      General: Lids are normal.      Extraocular Movements: Extraocular movements intact. Conjunctiva/sclera: Conjunctivae normal.   Pulmonary:      Effort: Pulmonary effort is normal.   Musculoskeletal:         General: Normal range of motion. Cervical back: Normal range of motion and neck supple. Comments: L knee focally tender over the L medial joint line. Pt demonstrates FROM without difficulty. No erythema, edema, or appreciable effusion. NV intact throughout. Gait is steady with a limp. Skin:     General: Skin is warm and dry. Capillary Refill: Capillary refill takes less than 2 seconds. Neurological:      General: No focal deficit present. Mental Status: She is alert and oriented to person, place, and time. Psychiatric:         Mood and Affect: Mood normal.         Behavior: Behavior normal. Behavior is cooperative. Diagnostic Study Results     Labs -   No results found for this or any previous visit (from the past 12 hour(s)). Radiologic Studies -   XR KNEE LT 3 V   Final Result   No acute abnormality. CT Results  (Last 48 hours)      None          CXR Results  (Last 48 hours)      None              Medical Decision Making   I am the first provider for this patient. I reviewed the vital signs, available nursing notes, past medical history, past surgical history, family history and social history. Vital Signs-Reviewed the patient's vital signs. Patient Vitals for the past 12 hrs:   Temp Pulse Resp BP SpO2   09/17/22 1513 97.8 °F (36.6 °C) 68 18 (!) 148/72 98 %       Records Reviewed: Nursing Notes and Old Medical Records    Provider Notes (Medical Decision Making):   No evidence of septic joint, gout, DVT. Ace wrap applied for comfort. Advised on RICE treatment, NSAIDs, Ortho follow-up. ED return precautions given. ED Course:   Initial assessment performed.  The patients presenting problems have been discussed, and they are in agreement with the care plan formulated and outlined with them. I have encouraged them to ask questions as they arise throughout their visit. Critical Care Time: None    Disposition:  dc    PLAN:  1. Discharge Medication List as of 9/17/2022  5:10 PM        START taking these medications    Details   naproxen (NAPROSYN) 250 mg tablet Take 1 Tablet by mouth two (2) times daily (with meals) for 7 days. , Normal, Disp-14 Tablet, R-0           CONTINUE these medications which have NOT CHANGED    Details   Symbicort 160-4.5 mcg/actuation HFAA Inhale 2 puffs by mouth twice daily, Normal, Disp-33 g, R-0, ARSEN      irbesartan (AVAPRO) 75 mg tablet TAKE 1 TABLET BY MOUTH ONCE DAILY, Normal, Disp-90 Tablet, R-1      LORazepam (ATIVAN) 0.5 mg tablet Take 1 Tablet by mouth every eight (8) hours as needed for Anxiety. Max Daily Amount: 1.5 mg., Normal, Disp-10 Tablet, R-0      metFORMIN (GLUCOPHAGE) 500 mg tablet TAKE 1 TABLET BY MOUTH TWICE DAILY, Normal, Disp-180 Tablet, R-1      montelukast (Singulair) 10 mg tablet Take 1 Tablet by mouth daily. , Normal, Disp-90 Tablet, R-3      hydroCHLOROthiazide (MICROZIDE) 12.5 mg capsule TAKE 1 CAPSULE BY MOUTH ONCE DAILY, Normal, Disp-90 Capsule, R-3      ibuprofen (MOTRIN) 800 mg tablet Take 1 Tablet by mouth every eight (8) hours as needed for Pain., Normal, Disp-30 Tablet, R-1      ciclopirox (PENLAC) 8 % solution Apply  to affected area nightly., Historical Med      clotrimazole-betamethasone (LOTRISONE) topical cream APPLY CREAM TOPICALLY TO AFFECTED AREA TWICE DAILY, Historical Med      cholecalciferol, vitamin D3, 50 mcg (2,000 unit) tab Take  by mouth daily. , Historical Med      mometasone (Nasonex) 50 mcg/actuation nasal spray 2 Sprays by Both Nostrils route as needed., Historical Med      multivitamin with iron (DAILY MULTI-VITAMINS/IRON) tablet Take 1 Tab by mouth daily. , Historical Med           2.    Follow-up Information       Follow up With Specialties Details Why Contact [facility] EMERGENCY DEPT Emergency Medicine  As needed, If symptoms worsen [address] [place] [address]  [phone]    Josephine Walsh MD Family Medicine Call  For follow up [facility] & [address] [facility]  [phone]      [facility]  Call  For follow up [facility]  [address] [facility],[address]  [address] [facility]  [phone]          Return to ED if worse     Diagnosis     Clinical Impression:   1. Acute pain of left knee    2. Arthritis of knee, left          Please note that this dictation was completed with [employer], the computer voice recognition software. Quite often unanticipated grammatical, syntax, homophones, and other interpretive errors are inadvertently transcribed by the computer software. Please disregards these errors. Please excuse any errors that have escaped final proofreading. I reviewed the patient's medical history, the PA's findings on physical examination, the patient's diagnoses, and treatment plan as documented in the PA note. I concur with the treatment plan as documented. Additional suggestions noted.      Napoleon Le MD  Signed By: Shannan Fraser MD     September 18, 2022

## 2022-09-21 ENCOUNTER — OFFICE VISIT (OUTPATIENT)
Dept: ORTHOPEDIC SURGERY | Age: 62
End: 2022-09-21
Payer: MEDICAID

## 2022-09-21 VITALS — WEIGHT: 206 LBS | HEIGHT: 69 IN | BODY MASS INDEX: 30.51 KG/M2

## 2022-09-21 DIAGNOSIS — S83.242A ACUTE MEDIAL MENISCUS TEAR OF LEFT KNEE, INITIAL ENCOUNTER: Primary | ICD-10-CM

## 2022-09-21 PROCEDURE — 99203 OFFICE O/P NEW LOW 30 MIN: CPT | Performed by: ORTHOPAEDIC SURGERY

## 2022-09-21 NOTE — PROGRESS NOTES
Jack Brown (: 1960) is a 57 y.o. female, new patient, here for evaluation of the following chief complaint(s):  Knee Pain       ASSESSMENT/PLAN:  Below is the assessment and plan developed based on review of pertinent history, physical exam, labs, studies, and medications. We discussed possibility of obtaining an MRI. However, she notes that symptoms seem to be improving with an anti-inflammatory. She will call us if she has any recurrence of her symptoms we will work on getting an MRI which would help with further treatment planning including possibility of surgical treatment. 1. Acute medial meniscus tear of left knee, initial encounter      Return if symptoms worsen or fail to improve. SUBJECTIVE/OBJECTIVE:  Jack Brown (: 1960) is a 57 y.o. female. She notes sharp aching pain at the medial left knee ongoing for the last couple of weeks. She denies any specific injury but notes that as of Saturday she could not walk on the knee and went to the ER. She has tried compression bracing and anti-inflammatories with moderate relief        Allergies   Allergen Reactions    Latex Swelling    Sulfa (Sulfonamide Antibiotics) Swelling     Vaginal swelling       Current Outpatient Medications   Medication Sig    naproxen (NAPROSYN) 250 mg tablet Take 1 Tablet by mouth two (2) times daily (with meals) for 7 days. Symbicort 160-4.5 mcg/actuation HFAA Inhale 2 puffs by mouth twice daily    irbesartan (AVAPRO) 75 mg tablet TAKE 1 TABLET BY MOUTH ONCE DAILY    LORazepam (ATIVAN) 0.5 mg tablet Take 1 Tablet by mouth every eight (8) hours as needed for Anxiety. Max Daily Amount: 1.5 mg.    metFORMIN (GLUCOPHAGE) 500 mg tablet TAKE 1 TABLET BY MOUTH TWICE DAILY    montelukast (Singulair) 10 mg tablet Take 1 Tablet by mouth daily.     hydroCHLOROthiazide (MICROZIDE) 12.5 mg capsule TAKE 1 CAPSULE BY MOUTH ONCE DAILY    ibuprofen (MOTRIN) 800 mg tablet Take 1 Tablet by mouth every eight (8) hours as needed for Pain. ciclopirox (PENLAC) 8 % solution Apply  to affected area nightly. clotrimazole-betamethasone (LOTRISONE) topical cream APPLY CREAM TOPICALLY TO AFFECTED AREA TWICE DAILY    cholecalciferol, vitamin D3, 50 mcg (2,000 unit) tab Take  by mouth daily. mometasone (Nasonex) 50 mcg/actuation nasal spray 2 Sprays by Both Nostrils route as needed. multivitamin with iron (DAILY MULTI-VITAMINS/IRON) tablet Take 1 Tab by mouth daily. No current facility-administered medications for this visit. Social History     Socioeconomic History    Marital status:      Spouse name: Not on file    Number of children: Not on file    Years of education: Not on file    Highest education level: Not on file   Occupational History    Not on file   Tobacco Use    Smoking status: Never    Smokeless tobacco: Never   Vaping Use    Vaping Use: Never used   Substance and Sexual Activity    Alcohol use: Not Currently    Drug use: Never    Sexual activity: Not Currently     Partners: Male   Other Topics Concern    Not on file   Social History Narrative    Not on file     Social Determinants of Health     Financial Resource Strain: Not on file   Food Insecurity: Not on file   Transportation Needs: Not on file   Physical Activity: Not on file   Stress: Not on file   Social Connections: Not on file   Intimate Partner Violence: Not on file   Housing Stability: Not on file       Past Surgical History:   Procedure Laterality Date    HX LIPOMA RESECTION      HX ORTHOPAEDIC Right     bunionectomy    HX TUBAL LIGATION  1983       Family History   Problem Relation Age of Onset    Diabetes Mother     Hypertension Mother     Kidney Disease Mother     No Known Problems Father     Cancer Sister         Breast    Breast Cancer Sister 52    Huntingtons disease Maternal Aunt     Huntingtons disease Maternal Grandfather         OB History    No obstetric history on file.             REVIEW OF SYSTEMS:    Patient denies any recent fever, chills, nausea, vomiting, chest pain, or shortness of breath. Vitals:  Ht 5' 9\" (1.753 m)   Wt 206 lb (93.4 kg)   BMI 30.42 kg/m²    Body mass index is 30.42 kg/m². PHYSICAL EXAM:  General exam: Patient is awake, alert, and oriented x3. Well-appearing. No acute distress. Ambulates with an antalgic gait    Left knee: Mild pain with Tressa's exam.  Mild tenderness palpation of the medial joint line. She has good range of motion of the knee on exam today. No effusion    IMAGING:    XR Results (most recent):  Results from Hospital Encounter encounter on 09/17/22    XR KNEE LT 3 V    Narrative  EXAM: XR KNEE LT 3 V    INDICATION: medial knee pain x 5 days, minimal trauma. COMPARISON: None. FINDINGS: Three views of the left knee demonstrate no fracture or other acute  osseous or articular abnormality. There is no effusion. Impression  No acute abnormality. No orders of the defined types were placed in this encounter. An electronic signature was used to authenticate this note.   -- Gisella Rogers, DO

## 2022-09-21 NOTE — LETTER
9/21/2022    Patient: Jack Brown   YOB: 1960   Date of Visit: 9/21/2022     Jennifer Smiley MD  [address]  [address] [facility]  [address] [address]  Via Fax: [phone]    Dear Jennifer Smiley MD,      Thank you for referring Ms. Anju Kenyon to [facility] for evaluation. My notes for this consultation are attached. If you have questions, please do not hesitate to call me. I look forward to following your patient along with you.       Sincerely,    Danna Jones, DO

## (undated) DEVICE — SOL IRR SOD CL 0.9% 1000ML BTL --

## (undated) DEVICE — SUTURE MCRYL SZ 4-0 L27IN ABSRB UD L19MM PS-2 1/2 CIR PRIM Y426H

## (undated) DEVICE — DEVICE TRNSF SPIK STL 2008S] MICROTEK MEDICAL INC]

## (undated) DEVICE — SUT PROL 3-0 18IN PS2 BLU --

## (undated) DEVICE — INSULATED BLADE ELECTRODE: Brand: EDGE

## (undated) DEVICE — Device

## (undated) DEVICE — INFECTION CONTROL KIT SYS

## (undated) DEVICE — SYR 10ML LUER LOK 1/5ML GRAD --

## (undated) DEVICE — DRAPE,EXTREMITY,89X128,STERILE: Brand: MEDLINE

## (undated) DEVICE — NEEDLE HYPO 25GA L1.5IN BVL ORIENTED ECLIPSE

## (undated) DEVICE — REM POLYHESIVE ADULT PATIENT RETURN ELECTRODE: Brand: VALLEYLAB

## (undated) DEVICE — SYRINGE,EAR/ULCER, 2 OZ, STERILE: Brand: MEDLINE

## (undated) DEVICE — STERILE POLYISOPRENE POWDER-FREE SURGICAL GLOVES: Brand: PROTEXIS

## (undated) DEVICE — PREP SKN CHLRAPRP APL 26ML STR --

## (undated) DEVICE — SUTURE VCRL SZ 3-0 L27IN ABSRB UD L26MM SH 1/2 CIR J416H

## (undated) DEVICE — YANKAUER,BULB TIP,W/O VENT,RIGID,STERILE: Brand: MEDLINE

## (undated) DEVICE — TUBING, SUCTION, 1/4" X 10', STRAIGHT: Brand: MEDLINE

## (undated) DEVICE — 3M™ TEGADERM™ TRANSPARENT FILM DRESSING FRAME STYLE, 1626W, 4 IN X 4-3/4 IN (10 CM X 12 CM), 50/CT 4CT/CASE: Brand: 3M™ TEGADERM™

## (undated) DEVICE — SPONGE GZ W4XL4IN COT 12 PLY TYP VII WVN C FLD DSGN

## (undated) DEVICE — DERMABOND SKIN ADH 0.7ML -- DERMABOND ADVANCED 12/BX